# Patient Record
Sex: MALE | Race: BLACK OR AFRICAN AMERICAN | Employment: FULL TIME | URBAN - METROPOLITAN AREA
[De-identification: names, ages, dates, MRNs, and addresses within clinical notes are randomized per-mention and may not be internally consistent; named-entity substitution may affect disease eponyms.]

---

## 2018-03-20 DIAGNOSIS — G56.20 ULNAR NERVE ENTRAPMENT, UNSPECIFIED LATERALITY: ICD-10-CM

## 2018-03-20 DIAGNOSIS — M79.641 PAIN OF RIGHT HAND: ICD-10-CM

## 2018-03-20 DIAGNOSIS — F51.01 PRIMARY INSOMNIA: ICD-10-CM

## 2018-03-21 ENCOUNTER — OFFICE VISIT (OUTPATIENT)
Dept: ENT CLINIC | Age: 43
End: 2018-03-21
Payer: COMMERCIAL

## 2018-03-21 VITALS
HEIGHT: 74 IN | BODY MASS INDEX: 22.59 KG/M2 | OXYGEN SATURATION: 99 % | HEART RATE: 57 BPM | DIASTOLIC BLOOD PRESSURE: 59 MMHG | SYSTOLIC BLOOD PRESSURE: 125 MMHG | WEIGHT: 176 LBS

## 2018-03-21 DIAGNOSIS — G47.30 SLEEP APNEA, UNSPECIFIED TYPE: Primary | ICD-10-CM

## 2018-03-21 DIAGNOSIS — R09.82 POST-NASAL DRIP: ICD-10-CM

## 2018-03-21 DIAGNOSIS — J30.9 CHRONIC ALLERGIC RHINITIS, UNSPECIFIED SEASONALITY, UNSPECIFIED TRIGGER: ICD-10-CM

## 2018-03-21 DIAGNOSIS — J35.01 CHRONIC TONSILLITIS: ICD-10-CM

## 2018-03-21 DIAGNOSIS — J35.8 TONSIL STONE: ICD-10-CM

## 2018-03-21 PROCEDURE — G8427 DOCREV CUR MEDS BY ELIG CLIN: HCPCS | Performed by: OTOLARYNGOLOGY

## 2018-03-21 PROCEDURE — G8420 CALC BMI NORM PARAMETERS: HCPCS | Performed by: OTOLARYNGOLOGY

## 2018-03-21 PROCEDURE — 4004F PT TOBACCO SCREEN RCVD TLK: CPT | Performed by: OTOLARYNGOLOGY

## 2018-03-21 PROCEDURE — 99204 OFFICE O/P NEW MOD 45 MIN: CPT | Performed by: OTOLARYNGOLOGY

## 2018-03-21 PROCEDURE — G8484 FLU IMMUNIZE NO ADMIN: HCPCS | Performed by: OTOLARYNGOLOGY

## 2018-03-21 RX ORDER — FLUTICASONE PROPIONATE 50 MCG
2 SPRAY, SUSPENSION (ML) NASAL DAILY
Qty: 1 BOTTLE | Refills: 3 | Status: SHIPPED | OUTPATIENT
Start: 2018-03-21 | End: 2019-08-23

## 2018-03-21 RX ORDER — TRAZODONE HYDROCHLORIDE 50 MG/1
50 TABLET ORAL NIGHTLY
COMMUNITY
End: 2018-03-26

## 2018-03-21 ASSESSMENT — ENCOUNTER SYMPTOMS
SHORTNESS OF BREATH: 0
NAUSEA: 0
SINUS PRESSURE: 0
RHINORRHEA: 0
SINUS PAIN: 0
SORE THROAT: 0
TROUBLE SWALLOWING: 0
EYES NEGATIVE: 1
COUGH: 0
VOMITING: 0
GASTROINTESTINAL NEGATIVE: 1

## 2018-03-21 NOTE — PROGRESS NOTES
Prescriptions:     traZODone (DESYREL) 50 MG tablet, Take 50 mg by mouth nightly, Disp: , Rfl:     naproxen (NAPROSYN) 500 MG tablet, TAKE ONE TABLET BY MOUTH TWICE DAILY WITH MEALS, Disp: 60 tablet, Rfl: 0    MELATONIN PO, Take by mouth, Disp: , Rfl:   Patient has no known allergies. Social History   Substance Use Topics    Smoking status: Light Tobacco Smoker     Packs/day: 0.25     Years: 12.00     Types: Cigarettes, Cigars    Smokeless tobacco: Never Used    Alcohol use 1.2 oz/week     2 Cans of beer per week      Comment: social     Family History   Problem Relation Age of Onset    Osteoarthritis Neg Hx     Rheum Arthritis Neg Hx     Asthma Neg Hx     Breast Cancer Neg Hx     Cancer Neg Hx     Depression Neg Hx     Diabetes Neg Hx     Heart Failure Neg Hx     High Cholesterol Neg Hx     Hypertension Neg Hx     Migraines Neg Hx     Rashes/Skin Problems Neg Hx     Seizures Neg Hx     Stroke Neg Hx     Thyroid Disease Neg Hx        Review of Systems   Constitutional: Negative for chills and fever. HENT: Positive for postnasal drip. Negative for congestion, rhinorrhea, sinus pain, sinus pressure, sore throat and trouble swallowing. Eyes: Negative. Negative for visual disturbance. Respiratory: Negative for cough and shortness of breath. Gastrointestinal: Negative. Negative for nausea and vomiting. Allergic/Immunologic: Negative for environmental allergies and food allergies. Neurological: Negative for dizziness and headaches. All other systems reviewed and are negative. BP (!) 125/59 (Site: Left Arm, Position: Sitting, Cuff Size: Medium Adult)   Pulse 57   Ht 6' 2\" (1.88 m)   Wt 176 lb (79.8 kg)   SpO2 99%   BMI 22.60 kg/m²   Physical Exam   Constitutional: He appears well-developed and well-nourished. HENT:   Head: Normocephalic and atraumatic. Right Ear: External ear and ear canal normal. Tympanic membrane is scarred.    Left Ear: Tympanic membrane, external ear Stef  1975    I have discussed the case, including pertinent history and exam findings with the PA. I have seen and examined the patient and the key elements of the encounter have been performed by me. I agree with the assessment, plan and orders as documented by the PA. Patient is here to establish care as a new patient in the clinic. I recommend:    Tonsillectomy and adenoidectomy. I will not keep the patient overnight for observation after surgery  The procedure risks and benefits were discussed with the patient and family including:      --Bleeding occurs in 1 to 4% of patients  --Poor speech (hyper nasal speech) occurs in 1/3000 patients. --Nasopharyngeal Stenosis  --Chipped Teeth  --Electrocautery Medina  --Death      Pt and family understood and decided to proceed with the surgery. Remainder of medical problems as per PA note. Patient seen and examined. Agree with above exam, assessment and plan.       Electronically signed by Bernice Mcelroy DO on 3/23/18 at 12:39 PM

## 2018-03-26 RX ORDER — NAPROXEN 500 MG/1
TABLET ORAL
Qty: 60 TABLET | Refills: 3 | Status: SHIPPED | OUTPATIENT
Start: 2018-03-26 | End: 2019-08-23

## 2018-03-26 RX ORDER — TRAZODONE HYDROCHLORIDE 50 MG/1
50 TABLET ORAL NIGHTLY
Qty: 30 TABLET | Refills: 5 | Status: SHIPPED | OUTPATIENT
Start: 2018-03-26 | End: 2018-08-09 | Stop reason: SDUPTHER

## 2018-03-28 ENCOUNTER — TELEPHONE (OUTPATIENT)
Dept: ENT CLINIC | Age: 43
End: 2018-03-28

## 2018-08-09 ENCOUNTER — OFFICE VISIT (OUTPATIENT)
Dept: FAMILY MEDICINE CLINIC | Age: 43
End: 2018-08-09
Payer: COMMERCIAL

## 2018-08-09 VITALS
SYSTOLIC BLOOD PRESSURE: 112 MMHG | TEMPERATURE: 97.3 F | HEIGHT: 74 IN | OXYGEN SATURATION: 98 % | HEART RATE: 70 BPM | WEIGHT: 178 LBS | RESPIRATION RATE: 12 BRPM | BODY MASS INDEX: 22.84 KG/M2 | DIASTOLIC BLOOD PRESSURE: 66 MMHG

## 2018-08-09 DIAGNOSIS — G56.20 ULNAR NERVE ENTRAPMENT, UNSPECIFIED LATERALITY: ICD-10-CM

## 2018-08-09 DIAGNOSIS — M79.641 PAIN OF RIGHT HAND: ICD-10-CM

## 2018-08-09 DIAGNOSIS — Z00.00 PREVENTATIVE HEALTH CARE: Primary | ICD-10-CM

## 2018-08-09 DIAGNOSIS — F51.01 PRIMARY INSOMNIA: ICD-10-CM

## 2018-08-09 PROCEDURE — 99396 PREV VISIT EST AGE 40-64: CPT | Performed by: FAMILY MEDICINE

## 2018-08-09 RX ORDER — NAPROXEN 500 MG/1
TABLET ORAL
Qty: 60 TABLET | Refills: 3 | Status: CANCELLED | OUTPATIENT
Start: 2018-08-09

## 2018-08-09 RX ORDER — TRAZODONE HYDROCHLORIDE 100 MG/1
100 TABLET ORAL NIGHTLY
Qty: 90 TABLET | Refills: 1 | Status: SHIPPED | OUTPATIENT
Start: 2018-08-09 | End: 2019-07-10 | Stop reason: SDUPTHER

## 2018-08-09 ASSESSMENT — ENCOUNTER SYMPTOMS
SHORTNESS OF BREATH: 0
DIARRHEA: 0
WHEEZING: 0
ABDOMINAL PAIN: 0
NAUSEA: 0
BLOOD IN STOOL: 0
CONSTIPATION: 0
COUGH: 0
VOMITING: 0

## 2018-08-09 NOTE — PROGRESS NOTES
Quang Barron  : 1975    Chief Complaint:  Chief Complaint   Patient presents with    Insomnia     follow up     Here for insomnia. Has had for years. Has tried OTC meds including melatonin and PM meds. Goes to bed regular time, about 10pm every night, awake until about 2-3 am and gets up at 5am. Melatonin 5mg did not help. Exercising regularly at the gym. Trying to go at least 5 days per week. Has been having some arm discomfort with lifting. Elbow. Some tingling in the forearma nad hand. Past medical, surgical, family and social histories reviewed and updated today as appropriate    Current Outpatient Prescriptions   Medication Sig Dispense Refill    traZODone (DESYREL) 100 MG tablet Take 1 tablet by mouth nightly 90 tablet 1    diclofenac (VOLTAREN) 50 MG EC tablet Take 1 tablet by mouth 2 times daily as needed for Pain 60 tablet 3    naproxen (NAPROSYN) 500 MG tablet TAKE ONE TABLET BY MOUTH TWICE DAILY WITH MEALS 60 tablet 3    fluticasone (FLONASE) 50 MCG/ACT nasal spray 2 sprays by Nasal route daily 2 sprays in each nostril daily 1 Bottle 3    MELATONIN PO Take by mouth       No current facility-administered medications for this visit. No Known Allergies    REVIEW OF SYSTEMS  Review of Systems   Constitutional: Negative for chills, diaphoresis, fatigue and fever. Eyes: Negative for redness and visual disturbance. Respiratory: Negative for cough, chest tightness, shortness of breath and wheezing. Cardiovascular: Negative for chest pain, palpitations and leg swelling. Gastrointestinal: Negative for abdominal pain, blood in stool, constipation, diarrhea, nausea and vomiting. Endocrine: Negative for polydipsia and polyuria. Musculoskeletal: Negative for arthralgias and myalgias. Skin: Negative for color change, pallor and rash. Neurological: Negative for dizziness, syncope, weakness, numbness and headaches.    Psychiatric/Behavioral: Negative for confusion and dysphoric mood. The patient is not nervous/anxious. All other systems reviewed and are negative. PHYSICAL EXAM  /66 (Site: Left Arm, Position: Sitting, Cuff Size: Medium Adult)   Pulse 70   Temp 97.3 °F (36.3 °C)   Resp 12   Ht 6' 2\" (1.88 m)   Wt 178 lb (80.7 kg)   SpO2 98%   BMI 22.85 kg/m²   Physical Exam   Constitutional: He appears well-developed and well-nourished. No distress. Eyes: Conjunctivae are normal. Pupils are equal, round, and reactive to light. Right eye exhibits no discharge. Left eye exhibits no discharge. No scleral icterus. Neck: Neck supple. Cardiovascular: Normal rate and regular rhythm. Exam reveals no gallop and no friction rub. No murmur heard. Pulmonary/Chest: Effort normal and breath sounds normal. No respiratory distress. He has no wheezes. He has no rales. Lymphadenopathy:     He has no cervical adenopathy. ASSESSMENT/PLAN:     Diagnosis Orders   1. Preventative health care  CBC Auto Differential    Comprehensive Metabolic Panel    Lipid Panel   2. Pain of right hand  diclofenac (VOLTAREN) 50 MG EC tablet   3. Ulnar nerve entrapment, unspecified laterality     4. Primary insomnia  traZODone (DESYREL) 100 MG tablet     Use home steroid cream for eczema, does not want anything for itch  Trial increased trazodone for insomnia, reviewed sleep hygiene  NSAID for arm PRN, get brace  HM reviewed today and counseled as appropriate    No future appointments.     Genesis Ellsworth MD   8/9/18

## 2018-08-19 ASSESSMENT — ENCOUNTER SYMPTOMS
CHEST TIGHTNESS: 0
EYE REDNESS: 0
COLOR CHANGE: 0

## 2019-05-29 ENCOUNTER — TELEPHONE (OUTPATIENT)
Dept: FAMILY MEDICINE CLINIC | Age: 44
End: 2019-05-29

## 2019-05-30 DIAGNOSIS — M79.641 RIGHT HAND PAIN: Primary | ICD-10-CM

## 2019-05-30 DIAGNOSIS — M25.522 LEFT ELBOW PAIN: ICD-10-CM

## 2019-07-10 ENCOUNTER — OFFICE VISIT (OUTPATIENT)
Dept: FAMILY MEDICINE CLINIC | Age: 44
End: 2019-07-10
Payer: COMMERCIAL

## 2019-07-10 VITALS
TEMPERATURE: 98.2 F | DIASTOLIC BLOOD PRESSURE: 76 MMHG | HEART RATE: 63 BPM | BODY MASS INDEX: 23.77 KG/M2 | RESPIRATION RATE: 16 BRPM | HEIGHT: 74 IN | WEIGHT: 185.2 LBS | SYSTOLIC BLOOD PRESSURE: 124 MMHG | OXYGEN SATURATION: 98 %

## 2019-07-10 DIAGNOSIS — G89.29 CHRONIC LOW BACK PAIN, UNSPECIFIED BACK PAIN LATERALITY, WITH SCIATICA PRESENCE UNSPECIFIED: Primary | ICD-10-CM

## 2019-07-10 DIAGNOSIS — F51.01 PRIMARY INSOMNIA: ICD-10-CM

## 2019-07-10 DIAGNOSIS — M54.5 CHRONIC LOW BACK PAIN, UNSPECIFIED BACK PAIN LATERALITY, WITH SCIATICA PRESENCE UNSPECIFIED: Primary | ICD-10-CM

## 2019-07-10 DIAGNOSIS — G56.03 CARPAL TUNNEL SYNDROME ON BOTH SIDES: ICD-10-CM

## 2019-07-10 DIAGNOSIS — K62.5 BRBPR (BRIGHT RED BLOOD PER RECTUM): ICD-10-CM

## 2019-07-10 DIAGNOSIS — K64.9 HEMORRHOIDS, UNSPECIFIED HEMORRHOID TYPE: ICD-10-CM

## 2019-07-10 DIAGNOSIS — R10.84 GENERALIZED ABDOMINAL PAIN: ICD-10-CM

## 2019-07-10 PROCEDURE — 99214 OFFICE O/P EST MOD 30 MIN: CPT | Performed by: FAMILY MEDICINE

## 2019-07-10 RX ORDER — TRAZODONE HYDROCHLORIDE 100 MG/1
100 TABLET ORAL NIGHTLY
Qty: 90 TABLET | Refills: 1 | Status: SHIPPED
Start: 2019-07-10 | End: 2022-03-04 | Stop reason: SDUPTHER

## 2019-07-10 RX ORDER — METHYLPREDNISOLONE 4 MG/1
TABLET ORAL
Qty: 1 KIT | Refills: 0 | Status: SHIPPED | OUTPATIENT
Start: 2019-07-10 | End: 2019-07-16

## 2019-07-10 ASSESSMENT — PATIENT HEALTH QUESTIONNAIRE - PHQ9
2. FEELING DOWN, DEPRESSED OR HOPELESS: 0
SUM OF ALL RESPONSES TO PHQ QUESTIONS 1-9: 0
SUM OF ALL RESPONSES TO PHQ QUESTIONS 1-9: 0
1. LITTLE INTEREST OR PLEASURE IN DOING THINGS: 0
SUM OF ALL RESPONSES TO PHQ9 QUESTIONS 1 & 2: 0

## 2019-07-10 NOTE — PROGRESS NOTES
tenderness, pain and spasm. He exhibits normal range of motion, no swelling, no edema, no deformity, no laceration and normal pulse. Skin: Skin is warm and dry. No rash noted. No erythema. No pallor. Vitals reviewed. ASSESSMENT/PLAN:     Diagnosis Orders   1. Chronic low back pain, unspecified back pain laterality, with sciatica presence unspecified  methylPREDNISolone (MEDROL, JAVI,) 4 MG tablet   2. Generalized abdominal pain  Linda Brunson MD, Gastroenterology, [place] ([facility])   3. Hemorrhoids, unspecified hemorrhoid type  Linda Brunson MD, Gastroenterology, [place] ([facility])   4. BRBPR (bright red blood per rectum)  Linda Brunson MD, Gastroenterology, [place] ([facility])   5. Carpal tunnel syndrome on both sides, chronic     6. Primary insomnia  traZODone (DESYREL) 100 MG tablet       Reviewed age and gender appropriate health screening exams and vaccinations. Advisedpatient regarding importance of keeping up with recommended health maintenance and to schedule as soon as possible if overdue, as this is important in assessing for undiagnosed pathology, especially cancer. Patientverbalizes understanding and agrees. Call or go to ED immediately if symptoms worsen or persist.  No follow-ups on file. Sooner if necessary. Counseled regarding above diagnosis, including possible risks and complications,especially if left uncontrolled. Counseled regarding the possible side effects, risks, benefits and alternatives to treatment; patient and/or guardian verbalizes understanding. Advised patient to call with any newmedication issues. All questions answered.     Inna Graham MD  7/10/19

## 2019-07-27 ASSESSMENT — ENCOUNTER SYMPTOMS
CHEST TIGHTNESS: 0
COLOR CHANGE: 0
ABDOMINAL PAIN: 0
COUGH: 0
BACK PAIN: 1
VOMITING: 0
BLOOD IN STOOL: 0
DIARRHEA: 0
CONSTIPATION: 0
WHEEZING: 0
SHORTNESS OF BREATH: 0

## 2019-08-23 ENCOUNTER — HOSPITAL ENCOUNTER (EMERGENCY)
Age: 44
Discharge: HOME | End: 2019-08-23
Attending: EMERGENCY MEDICINE
Payer: COMMERCIAL

## 2019-08-23 VITALS
OXYGEN SATURATION: 98 % | TEMPERATURE: 97.9 F | HEART RATE: 86 BPM | WEIGHT: 177 LBS | BODY MASS INDEX: 22.72 KG/M2 | RESPIRATION RATE: 16 BRPM | SYSTOLIC BLOOD PRESSURE: 138 MMHG | DIASTOLIC BLOOD PRESSURE: 78 MMHG | HEIGHT: 74 IN

## 2019-08-23 DIAGNOSIS — L24.3 IRRITANT CONTACT DERMATITIS DUE TO COSMETICS: Primary | ICD-10-CM

## 2019-08-23 PROCEDURE — 96372 THER/PROPH/DIAG INJ SC/IM: CPT

## 2019-08-23 PROCEDURE — 6360000002 HC RX W HCPCS: Performed by: EMERGENCY MEDICINE

## 2019-08-23 PROCEDURE — 99282 EMERGENCY DEPT VISIT SF MDM: CPT

## 2019-08-23 RX ORDER — TRIAMCINOLONE ACETONIDE 40 MG/ML
40 INJECTION, SUSPENSION INTRA-ARTICULAR; INTRAMUSCULAR ONCE
Status: COMPLETED | OUTPATIENT
Start: 2019-08-23 | End: 2019-08-23

## 2019-08-23 RX ORDER — TRIAMCINOLONE ACETONIDE 5 MG/G
CREAM TOPICAL
Qty: 45 G | Refills: 0 | Status: SHIPPED | OUTPATIENT
Start: 2019-08-23 | End: 2019-08-30

## 2019-08-23 RX ORDER — PREDNISONE 20 MG/1
TABLET ORAL
Qty: 18 TABLET | Refills: 0 | Status: SHIPPED | OUTPATIENT
Start: 2019-08-23 | End: 2019-09-02

## 2019-08-23 RX ADMIN — TRIAMCINOLONE ACETONIDE 40 MG: 40 INJECTION, SUSPENSION INTRA-ARTICULAR; INTRAMUSCULAR at 06:01

## 2019-08-23 NOTE — ED NOTES
Facial itching under his beard.  Did not take Benadryl or OTC     Manassa CONNOR Mendoza  08/23/19 [id number]

## 2019-08-23 NOTE — ED PROVIDER NOTES
HPI:  8/23/19, Time: 5:37 AM        Julia Wright is a 38 y.o. male presenting to the ED for rash and itching, beginning 8 hours ago. The complaint has been persistent, moderate in severity, and worsened by nothing. Rash began after using a beard dye coloring agent. No lip/tongue swelling, no fever/chills, no shortness of breath, no n/v/d, rash is involving beard area only. No rash on extremities nor trunk. No other complaints. Review of Systems:   Pertinent positives and negatives are stated within HPI, all other systems reviewed and are negative.      --------------------------------------------- PAST HISTORY ---------------------------------------------  Past Medical History:  has a past medical history of Carpal tunnel syndrome on both sides, chronic, DeQuervain's disease (tenosynovitis), Diarrhea, and Finger laceration. Past Surgical History:  has a past surgical history that includes Vasectomy (2007); Finger fracture surgery (1990); Carpal tunnel release; Ulnar tunnel release; Hemorrhoid surgery (02/20/2015); and cyst removal (2007). Social History:  reports that he has been smoking cigarettes and cigars. He has a 3.00 pack-year smoking history. He has never used smokeless tobacco. He reports that he drinks about 2.0 standard drinks of alcohol per week. He reports that he does not use drugs. Family History: family history is not on file. The patients home medications have been reviewed. Allergies: Patient has no known allergies. -------------------------------------------------- RESULTS -------------------------------------------------  All laboratory and radiology results have been personally reviewed by myself   LABS:  No results found for this visit on 08/23/19.     RADIOLOGY:  Interpreted by Radiologist.  No orders to display       ------------------------- NURSING NOTES AND VITALS REVIEWED ---------------------------    The nursing notes within the ED encounter and vital was transcribed using voice recognition software.  Every effort was made to ensure accuracy; however, inadvertent computerized transcription errors may be present        Sheyla Jensen MD  08/27/19 [id number]

## 2019-11-13 ENCOUNTER — TELEPHONE (OUTPATIENT)
Dept: FAMILY MEDICINE CLINIC | Age: 44
End: 2019-11-13

## 2019-11-14 DIAGNOSIS — M79.641 PAIN OF RIGHT HAND: ICD-10-CM

## 2020-05-21 ENCOUNTER — TELEPHONE (OUTPATIENT)
Dept: ADMINISTRATIVE | Age: 45
End: 2020-05-21

## 2020-08-25 ENCOUNTER — APPOINTMENT (OUTPATIENT)
Dept: CT IMAGING | Age: 45
End: 2020-08-25
Payer: COMMERCIAL

## 2020-08-25 ENCOUNTER — HOSPITAL ENCOUNTER (EMERGENCY)
Age: 45
Discharge: HOME | End: 2020-08-25
Attending: EMERGENCY MEDICINE
Payer: COMMERCIAL

## 2020-08-25 VITALS
HEIGHT: 74 IN | BODY MASS INDEX: 23.1 KG/M2 | OXYGEN SATURATION: 97 % | DIASTOLIC BLOOD PRESSURE: 76 MMHG | WEIGHT: 180 LBS | RESPIRATION RATE: 20 BRPM | HEART RATE: 66 BPM | SYSTOLIC BLOOD PRESSURE: 128 MMHG | TEMPERATURE: 98.1 F

## 2020-08-25 PROCEDURE — 99285 EMERGENCY DEPT VISIT HI MDM: CPT

## 2020-08-25 PROCEDURE — 99284 EMERGENCY DEPT VISIT MOD MDM: CPT

## 2020-08-25 PROCEDURE — 96375 TX/PRO/DX INJ NEW DRUG ADDON: CPT

## 2020-08-25 PROCEDURE — 70450 CT HEAD/BRAIN W/O DYE: CPT

## 2020-08-25 PROCEDURE — 96374 THER/PROPH/DIAG INJ IV PUSH: CPT

## 2020-08-25 PROCEDURE — 6360000002 HC RX W HCPCS: Performed by: EMERGENCY MEDICINE

## 2020-08-25 RX ORDER — DIPHENHYDRAMINE HYDROCHLORIDE 50 MG/ML
25 INJECTION INTRAMUSCULAR; INTRAVENOUS ONCE
Status: COMPLETED | OUTPATIENT
Start: 2020-08-25 | End: 2020-08-25

## 2020-08-25 RX ORDER — METOCLOPRAMIDE HYDROCHLORIDE 5 MG/ML
10 INJECTION INTRAMUSCULAR; INTRAVENOUS ONCE
Status: COMPLETED | OUTPATIENT
Start: 2020-08-25 | End: 2020-08-25

## 2020-08-25 RX ADMIN — DIPHENHYDRAMINE HYDROCHLORIDE 25 MG: 50 INJECTION, SOLUTION INTRAMUSCULAR; INTRAVENOUS at 19:21

## 2020-08-25 RX ADMIN — METOCLOPRAMIDE 10 MG: 5 INJECTION, SOLUTION INTRAMUSCULAR; INTRAVENOUS at 19:20

## 2020-08-25 ASSESSMENT — ENCOUNTER SYMPTOMS
EYE REDNESS: 0
EYE PAIN: 0
ABDOMINAL PAIN: 0
SHORTNESS OF BREATH: 0
BACK PAIN: 0
SORE THROAT: 0
EYE DISCHARGE: 0
DIARRHEA: 0
COUGH: 0
PHOTOPHOBIA: 0
VOMITING: 0
NAUSEA: 0
WHEEZING: 0
SINUS PRESSURE: 0

## 2020-08-25 ASSESSMENT — PAIN SCALES - GENERAL: PAINLEVEL_OUTOF10: 5

## 2020-08-25 NOTE — ED PROVIDER NOTES
Department of Emergency Medicine   ED  Provider Note  Admit Date/RoomTime: 8/25/2020  5:40 PM  ED Room: [facility] [id number]                  History of Present Illness:  8/25/20, Time: 6:41 PM EDT         Jose Richmond is a 41 y.o. male presenting to the ED for headache , beginning on Saturday, 3 days ago. The complaint has been intermittent, moderate in severity, and worsened by nothing. no history of migraines. Headache not similar to prior headaches. + gradual in onset, not worst headache of life, not maximal at onset, not thunderclap. No fever, chills, neck pain, neck stiffness, trauma, recent illness, speech changes, or any neurologic symptoms or deficits, or vision changes. No household contacts with headache. Patient reports severity the pain ranges from 3-5. He reports he is been alternating Tylenol, Motrin, Excedrin Migraine with only partial relief. He reports he is having a pounding pain in his left temple and can feel the artery there. Reports he had a family member with a history of aneurysm and is concerned that is the cause of his headache causing his presentation today. No SAH risk factors      Review of Systems:   Pertinent positives and negatives are stated within HPI, all other systems reviewed and are negative. Review of Systems   Constitutional: Negative for chills and fever. HENT: Negative for ear pain, sinus pressure and sore throat. Eyes: Negative for photophobia, pain, discharge, redness and visual disturbance. Respiratory: Negative for cough, shortness of breath and wheezing. Cardiovascular: Negative for chest pain. Gastrointestinal: Negative for abdominal pain, diarrhea, nausea and vomiting. Genitourinary: Negative for dysuria and frequency. Musculoskeletal: Negative for arthralgias and back pain. Skin: Negative for rash and wound. Neurological: Positive for headaches. Negative for weakness. Hematological: Negative for adenopathy.    All other systems reviewed and are negative.             --------------------------------------------- PAST HISTORY ---------------------------------------------  Past Medical History:  has a past medical history of Carpal tunnel syndrome on both sides, chronic, DeQuervain's disease (tenosynovitis), Diarrhea, and Finger laceration. Past Surgical History:  has a past surgical history that includes Vasectomy (2007); Finger fracture surgery (1990); Carpal tunnel release; Ulnar tunnel release; Hemorrhoid surgery (02/20/2015); and cyst removal (2007). Social History:  reports that he has been smoking cigarettes and cigars. He has a 3.00 pack-year smoking history. He has never used smokeless tobacco. He reports current alcohol use of about 2.0 standard drinks of alcohol per week. He reports current drug use. Drug: Marijuana. Family History: family history is not on file. The patients home medications have been reviewed. Allergies: Patient has no known allergies. ---------------------------------------------------PHYSICAL EXAM--------------------------------------    Constitutional/General: Alert and oriented x3, well appearing, non toxic in NAD  Head: Normocephalic and atraumatic. No tenderness over temporal artery. Eyes: PERRL, EOMI  Mouth: Oropharynx clear, handling secretions, no trismus  Neck: Supple, full ROM, non tender to palpation in the midline, no stridor, no crepitus, no meningeal signs  Pulmonary: Lungs clear to auscultation bilaterally, no wheezes, rales, or rhonchi. Not in respiratory distress  Cardiovascular:  Regular rate. Regular rhythm. No murmurs, gallops, or rubs. 2+ distal pulses  Abdomen: Soft. Non tender. Non distended. +BS. No rebound, guarding, or rigidity. Musculoskeletal: Moves all extremities x 4. Warm and well perfused, no clubbing, cyanosis, or edema. Capillary refill <3 seconds  Skin: warm and dry. No rashes.    Neurologic: GCS 15, no focal deficits, CN 2-12 grossly intact, Patients symptoms are improving      Counseling: The emergency provider has spoken with the patient and discussed todays results, in addition to providing specific details for the plan of care and counseling regarding the diagnosis and prognosis. Questions are answered at this time and they are agreeable with the plan.       --------------------------------- IMPRESSION AND DISPOSITION ---------------------------------    IMPRESSION  1. Acute nonintractable headache, unspecified headache type        DISPOSITION  Disposition: Discharge to home  Patient condition is stable        NOTE: This report was transcribed using voice recognition software.  Every effort was made to ensure accuracy; however, inadvertent computerized transcription errors may be present         Kerri Jolley DO  08/27/20 1052

## 2020-11-11 ENCOUNTER — TELEPHONE (OUTPATIENT)
Dept: PRIMARY CARE CLINIC | Age: 45
End: 2020-11-11

## 2020-11-11 NOTE — TELEPHONE ENCOUNTER
States bowel issues- when he goes to the bathroom he has bleeding-states he has blood pouring out of him- states it is a steady stream of blood-reports a hx of internal hemmoroids w/previous surgery. This occurring about every other day. Denies any other sx, lightheadedness/fatigue. Scheduled with Dr. Shreyas Biggs for 11/18, patient will call Dr. Kristine Ramos office to see if they can get him in sooner (ph # given), and if sx get worse or don't better he will go to the ER.

## 2020-11-18 ENCOUNTER — OFFICE VISIT (OUTPATIENT)
Dept: PRIMARY CARE CLINIC | Age: 45
End: 2020-11-18
Payer: COMMERCIAL

## 2020-11-18 VITALS
BODY MASS INDEX: 23.1 KG/M2 | OXYGEN SATURATION: 100 % | SYSTOLIC BLOOD PRESSURE: 114 MMHG | HEART RATE: 61 BPM | WEIGHT: 180 LBS | HEIGHT: 74 IN | TEMPERATURE: 97.9 F | DIASTOLIC BLOOD PRESSURE: 73 MMHG

## 2020-11-18 DIAGNOSIS — Z00.00 PREVENTATIVE HEALTH CARE: ICD-10-CM

## 2020-11-18 LAB
ALBUMIN SERPL-MCNC: 4.2 G/DL (ref 3.5–5.2)
ALP BLD-CCNC: 38 U/L (ref 40–129)
ALT SERPL-CCNC: 10 U/L (ref 0–40)
ANION GAP SERPL CALCULATED.3IONS-SCNC: 7 MMOL/L (ref 7–16)
AST SERPL-CCNC: 17 U/L (ref 0–39)
BASOPHILS ABSOLUTE: 0.05 E9/L (ref 0–0.2)
BASOPHILS RELATIVE PERCENT: 1 % (ref 0–2)
BILIRUB SERPL-MCNC: 0.4 MG/DL (ref 0–1.2)
BUN BLDV-MCNC: 13 MG/DL (ref 6–20)
CALCIUM SERPL-MCNC: 9.3 MG/DL (ref 8.6–10.2)
CHLORIDE BLD-SCNC: 106 MMOL/L (ref 98–107)
CHOLESTEROL, TOTAL: 156 MG/DL (ref 0–199)
CO2: 28 MMOL/L (ref 22–29)
CREAT SERPL-MCNC: 1.2 MG/DL (ref 0.7–1.2)
EOSINOPHILS ABSOLUTE: 0.21 E9/L (ref 0.05–0.5)
EOSINOPHILS RELATIVE PERCENT: 4.2 % (ref 0–6)
GFR AFRICAN AMERICAN: >60
GFR NON-AFRICAN AMERICAN: >60 ML/MIN/1.73
GLUCOSE BLD-MCNC: 98 MG/DL (ref 74–99)
HCT VFR BLD CALC: 41.5 % (ref 37–54)
HDLC SERPL-MCNC: 65 MG/DL
HEMOGLOBIN: 13.4 G/DL (ref 12.5–16.5)
IMMATURE GRANULOCYTES #: 0.01 E9/L
IMMATURE GRANULOCYTES %: 0.2 % (ref 0–5)
LDL CHOLESTEROL CALCULATED: 76 MG/DL (ref 0–99)
LYMPHOCYTES ABSOLUTE: 1.4 E9/L (ref 1.5–4)
LYMPHOCYTES RELATIVE PERCENT: 27.9 % (ref 20–42)
MCH RBC QN AUTO: 28.9 PG (ref 26–35)
MCHC RBC AUTO-ENTMCNC: 32.3 % (ref 32–34.5)
MCV RBC AUTO: 89.4 FL (ref 80–99.9)
MONOCYTES ABSOLUTE: 0.95 E9/L (ref 0.1–0.95)
MONOCYTES RELATIVE PERCENT: 18.9 % (ref 2–12)
NEUTROPHILS ABSOLUTE: 2.4 E9/L (ref 1.8–7.3)
NEUTROPHILS RELATIVE PERCENT: 47.8 % (ref 43–80)
PDW BLD-RTO: 14.5 FL (ref 11.5–15)
PLATELET # BLD: 257 E9/L (ref 130–450)
PMV BLD AUTO: 10.4 FL (ref 7–12)
POTASSIUM SERPL-SCNC: 5.5 MMOL/L (ref 3.5–5)
RBC # BLD: 4.64 E12/L (ref 3.8–5.8)
SODIUM BLD-SCNC: 141 MMOL/L (ref 132–146)
TOTAL PROTEIN: 6.5 G/DL (ref 6.4–8.3)
TRIGL SERPL-MCNC: 74 MG/DL (ref 0–149)
VLDLC SERPL CALC-MCNC: 15 MG/DL
WBC # BLD: 5 E9/L (ref 4.5–11.5)

## 2020-11-18 PROCEDURE — 99396 PREV VISIT EST AGE 40-64: CPT | Performed by: FAMILY MEDICINE

## 2020-11-18 RX ORDER — MELOXICAM 15 MG/1
15 TABLET ORAL DAILY
Qty: 30 TABLET | Refills: 3 | Status: SHIPPED
Start: 2020-11-18 | End: 2021-04-23 | Stop reason: SDUPTHER

## 2020-11-18 ASSESSMENT — ENCOUNTER SYMPTOMS
DIARRHEA: 0
COUGH: 0
VOMITING: 0
WHEEZING: 0
ABDOMINAL PAIN: 0
SHORTNESS OF BREATH: 0
BLOOD IN STOOL: 0
CONSTIPATION: 0
CHEST TIGHTNESS: 0
BACK PAIN: 1
COLOR CHANGE: 0

## 2020-11-18 NOTE — PROGRESS NOTES
daily 30 tablet 3    mupirocin (BACTROBAN) 2 % ointment Apply topically 3 times daily. 22 g 0    traZODone (DESYREL) 100 MG tablet Take 1 tablet by mouth nightly 90 tablet 1     No current facility-administered medications for this visit. No Known Allergies      REVIEW OF SYSTEMS  Review of Systems   Constitutional: Positive for activity change. Negative for appetite change, chills, diaphoresis, fatigue, fever and unexpected weight change. Respiratory: Negative for cough, chest tightness, shortness of breath and wheezing. Cardiovascular: Negative for chest pain, palpitations and leg swelling. Gastrointestinal: Negative for abdominal pain, blood in stool, constipation, diarrhea and vomiting. Endocrine: Negative for cold intolerance, heat intolerance, polydipsia, polyphagia and polyuria. Musculoskeletal: Positive for arthralgias, back pain, gait problem, joint swelling and myalgias. Negative for neck pain and neck stiffness. Skin: Negative for color change, pallor and rash. Neurological: Positive for weakness and numbness. Negative for dizziness, syncope, light-headedness and headaches. All other systems reviewed and are negative. PHYSICAL EXAM  /73   Pulse 61   Temp 97.9 °F (36.6 °C)   Ht 6' 2\" (1.88 m)   Wt 180 lb (81.6 kg)   SpO2 100%   BMI 23.11 kg/m²   Physical Exam  Vitals signs reviewed. Constitutional:       General: He is not in acute distress. Appearance: He is well-developed. Neck:      Musculoskeletal: Normal range of motion and neck supple. Cardiovascular:      Rate and Rhythm: Normal rate and regular rhythm. Heart sounds: Normal heart sounds. Pulmonary:      Effort: Pulmonary effort is normal.      Breath sounds: Normal breath sounds. No wheezing or rales. Musculoskeletal:         General: Tenderness present. No deformity. Right wrist: He exhibits decreased range of motion and tenderness.  He exhibits no swelling, no effusion, no crepitus, no deformity and no laceration. Left wrist: He exhibits decreased range of motion and tenderness. He exhibits no bony tenderness, no swelling, no effusion, no crepitus and no deformity. Lumbar back: He exhibits tenderness, pain and spasm. He exhibits normal range of motion, no swelling, no edema, no deformity, no laceration and normal pulse. Skin:     General: Skin is warm and dry. Coloration: Skin is not pale. Findings: No erythema or rash. ASSESSMENT/PLAN:     Diagnosis Orders   1. Preventative health care  CBC Auto Differential    Comprehensive Metabolic Panel    Lipid Panel   2. De Quervain's disease (tenosynovitis)  SKYLA Buckner MD, Psychiatry, [place]   3. Chronic bilateral low back pain without sciatica  SKYLA Buckner MD, Psychiatry, [place]    meloxicam ([address]) 15 MG tablet     Counseling/Anticipatory Guidance provided on the following: Nutrition, physical activity, healthy weight, injury prevention, misuse of tobacco, alcohol and drugs, sexual behavior and STDs, contraception, dental health, mental health, immunizations, screenings:  Health Maintenance Due   Topic Date Due    Pneumococcal 0-64 years Vaccine (1 of 1 - PPSV23) 04/15/1982    Flu vaccine (1) 09/01/2020       Reviewed age and gender appropriate health screening exams and vaccinations. Advisedpatient regarding importance of keeping up with recommended health maintenance and to schedule as soon as possible if overdue, as this is important in assessing for undiagnosed pathology, especially cancer. Patientverbalizes understanding and agrees. Call or go to ED immediately if symptoms worsen or persist.  No follow-ups on file. Sooner if necessary. Counseled regarding above diagnosis, including possible risks and complications,especially if left uncontrolled.   Counseled regarding the possible side effects, risks, benefits and alternatives to treatment;

## 2020-12-10 ENCOUNTER — OFFICE VISIT (OUTPATIENT)
Dept: SURGERY | Age: 45
End: 2020-12-10
Payer: COMMERCIAL

## 2020-12-10 VITALS
BODY MASS INDEX: 23.1 KG/M2 | WEIGHT: 180 LBS | DIASTOLIC BLOOD PRESSURE: 70 MMHG | HEART RATE: 94 BPM | SYSTOLIC BLOOD PRESSURE: 115 MMHG | TEMPERATURE: 97.5 F | HEIGHT: 74 IN

## 2020-12-10 PROCEDURE — 99204 OFFICE O/P NEW MOD 45 MIN: CPT | Performed by: SURGERY

## 2020-12-10 NOTE — PROGRESS NOTES
General Surgery History and Physical    Patient's Name/Date of Birth: Emily Perkins / 1975    Date: December 10, 2020     Surgeon: Robyn Alexis M.D.    PCP: Junior Aj MD     Chief Complaint: hemorrhoids    HPI:   Emily Perkins is a 41 y.o. male who presents for evaluation of hemorrhoid that are painful. They have been present for years, is  having daily bms, does not take stool softener and does not take fiber. The patient has  had medicines to treat the hemorrhoids. The hemorrhoids cause severe pain, some bleeding. Last colonoscopy was recent. Past Medical History:   Diagnosis Date    Carpal tunnel syndrome on both sides, chronic     DeQuervain's disease (tenosynovitis) 3/21/2011    Diarrhea     Finger laceration 3/21/2011       Past Surgical History:   Procedure Laterality Date    CARPAL TUNNEL RELEASE      CYST REMOVAL  2007    Spermcidal    FINGER FRACTURE SURGERY  1990    HEMORRHOID SURGERY  02/20/2015    ULNAR TUNNEL RELEASE      VASECTOMY  2007       Current Outpatient Medications   Medication Sig Dispense Refill    meloxicam (MOBIC) 15 MG tablet Take 1 tablet by mouth daily 30 tablet 3    mupirocin (BACTROBAN) 2 % ointment Apply topically 3 times daily. 22 g 0    traZODone (DESYREL) 100 MG tablet Take 1 tablet by mouth nightly 90 tablet 1     No current facility-administered medications for this visit. No Known Allergies    The patient has a family history that is negative for severe cardiovascular or respiratory issues, negative for reaction to anesthesia.     Social History     Socioeconomic History    Marital status: Single     Spouse name: Not on file    Number of children: Not on file    Years of education: Not on file    Highest education level: Not on file   Occupational History    Not on file   Social Needs    Financial resource strain: Not on file    Food insecurity     Worry: Not on file     Inability: Not on file   [employer] needs Medical: Not on file     Non-medical: Not on file   Tobacco Use    Smoking status: Light Tobacco Smoker     Packs/day: 0.25     Years: 12.00     Pack years: 3.00     Types: Cigarettes, Cigars    Smokeless tobacco: Never Used   Substance and Sexual Activity    Alcohol use: Yes     Alcohol/week: 2.0 standard drinks     Types: 2 Cans of beer per week     Comment: social    Drug use: Yes     Types: Marijuana     Comment: used to smoke marijuana/ said he only did monday to stop the nausea.       Sexual activity: Not on file   Lifestyle    Physical activity     Days per week: Not on file     Minutes per session: Not on file    Stress: Not on file   Relationships    Social connections     Talks on phone: Not on file     Gets together: Not on file     Attends Orthodoxy service: Not on file     Active member of club or organization: Not on file     Attends meetings of clubs or organizations: Not on file     Relationship status: Not on file    Intimate partner violence     Fear of current or ex partner: Not on file     Emotionally abused: Not on file     Physically abused: Not on file     Forced sexual activity: Not on file   Other Topics Concern    Not on file   Social History Narrative    ** Merged History Encounter **                Review of Systems  Review of Systems -  General ROS: negative for - chills, fatigue or malaise  ENT ROS: negative for - hearing change, nasal congestion or nasal discharge  Allergy and Immunology ROS: negative for - hives, itchy/watery eyes or nasal congestion  Hematological and Lymphatic ROS: negative for - blood clots, blood transfusions, bruising or fatigue  Endocrine ROS: negative for - malaise/lethargy, mood swings, palpitations or polydipsia/polyuria  Breast ROS: negative for - new or changing breast lumps or nipple changes  Respiratory ROS: negative for - sputum changes, stridor, tachypnea or wheezing  Cardiovascular ROS: negative for - irregular heartbeat, loss of consciousness, murmur or orthopnea  Gastrointestinal ROS: negative for - constipation, diarrhea, gas/bloating, heartburn or hematemesis, positive for hemorroids with intermittent bleeding and pain  Genito-Urinary ROS: negative for -  genital discharge, genital ulcers or hematuria  Musculoskeletal ROS: negative for - gait disturbance, muscle pain or muscular weakness    Physical exam:   /70   Pulse 94   Temp 97.5 °F (36.4 °C) (Temporal)   Ht 6' 2\" (1.88 m)   Wt 180 lb (81.6 kg)   BMI 23.11 kg/m²   General appearance:  NAD  Pyscho/social: negative for tremors and hallucinations  Head: NCAT, PERRLA, EOMI, red conjunctiva  Neck: supple, no masses  Lungs: CTAB, equal chest rise bilateral  Heart: Reg rate  Abdomen: soft, nontender, nondistended  Rectal: internal hemorrhoids, non inflamed, non thrombosed. Skin; no lesions  Gu: no cva tenderness  Extremities: extremities normal, atraumatic, no cyanosis or edema      Assessment:  41 y.o. male with hemorrhoids    Plan:  High fiber diet, stool softener, prn laxative for daily bm and hemorrhoidectomy.   Tamy Davis MD  3:38 PM  12/10/2020

## 2021-03-03 ENCOUNTER — OFFICE VISIT (OUTPATIENT)
Dept: ORTHOPEDIC SURGERY | Age: 46
End: 2021-03-03
Payer: COMMERCIAL

## 2021-03-03 ENCOUNTER — OFFICE VISIT (OUTPATIENT)
Dept: PRIMARY CARE CLINIC | Age: 46
End: 2021-03-03
Payer: COMMERCIAL

## 2021-03-03 ENCOUNTER — HOSPITAL ENCOUNTER (OUTPATIENT)
Dept: GENERAL RADIOLOGY | Age: 46
Discharge: HOME | End: 2021-03-05
Payer: COMMERCIAL

## 2021-03-03 ENCOUNTER — HOSPITAL ENCOUNTER (OUTPATIENT)
Age: 46
Discharge: HOME | End: 2021-03-05
Payer: COMMERCIAL

## 2021-03-03 VITALS
TEMPERATURE: 97.8 F | SYSTOLIC BLOOD PRESSURE: 122 MMHG | DIASTOLIC BLOOD PRESSURE: 84 MMHG | HEIGHT: 74 IN | OXYGEN SATURATION: 99 % | WEIGHT: 176 LBS | BODY MASS INDEX: 22.59 KG/M2 | HEART RATE: 59 BPM

## 2021-03-03 VITALS — TEMPERATURE: 98.1 F

## 2021-03-03 DIAGNOSIS — S69.91XA INJURY OF RIGHT HAND, INITIAL ENCOUNTER: ICD-10-CM

## 2021-03-03 DIAGNOSIS — S62.306A CLOSED FRACTURE OF FIFTH METACARPAL BONE OF RIGHT HAND, UNSPECIFIED FRACTURE MORPHOLOGY, INITIAL ENCOUNTER: Primary | ICD-10-CM

## 2021-03-03 DIAGNOSIS — S62.336A CLOSED DISPLACED FRACTURE OF NECK OF FIFTH METACARPAL BONE OF RIGHT HAND, INITIAL ENCOUNTER: Primary | ICD-10-CM

## 2021-03-03 PROCEDURE — 29125 APPL SHORT ARM SPLINT STATIC: CPT | Performed by: PHYSICIAN ASSISTANT

## 2021-03-03 PROCEDURE — 99202 OFFICE O/P NEW SF 15 MIN: CPT | Performed by: PHYSICIAN ASSISTANT

## 2021-03-03 PROCEDURE — 99214 OFFICE O/P EST MOD 30 MIN: CPT | Performed by: NURSE PRACTITIONER

## 2021-03-03 PROCEDURE — 26600 TREAT METACARPAL FRACTURE: CPT | Performed by: PHYSICIAN ASSISTANT

## 2021-03-03 PROCEDURE — 73130 X-RAY EXAM OF HAND: CPT

## 2021-03-03 NOTE — PROGRESS NOTES
reports current drug use. Drug: Marijuana. Family History: family history is not on file. Allergies: Patient has no known allergies. Physical Exam   Vital Signs:  /84   Pulse 59   Temp 97.8 °F (36.6 °C)   Ht 6' 2\" (1.88 m)   Wt 176 lb (79.8 kg)   SpO2 99%   BMI 22.60 kg/m²    Oxygen Saturation Interpretation: Normal.    Constitutional:  A&Ox3, development consistent with age, NAD. CV: Heart RRR, no murmurs, rubs, or gallops. Lungs: CTAB without W/R/R. Wrist: Right              Tenderness:  None            Swelling: None             Deformity: No obvious deformity noted. ROM: Full range of motion             Skin:  No erythema, rashes, or abrasions noted. Neurovascular: Motor deficit: /UE strength 5/5 bilaterally. No increased pain with supination or pronation of the hand. Sensory deficit:   Sensation intact proximally and distally to the injury site. Pulse deficit: 2+ and bounding. Capillary refill: Less then 2 sec throughout. Hand: Right              Tenderness: to distal metacarpal of small finger              Swelling: noted to lateral aspect of hand. Deformity: No obvious deformity. No malrotation noted. ROM: Decreased ROM due to pain. Pain with extension of little finger. Tightness with flexion of little finger              Skin:  No abrasions, erythema, or rashes noted. Neurological:  Alert and oriented. Motor functions intact. Test Results Section   Imaging: All Radiology results interpreted by Radiologist unless otherwise noted. Xr Hand Right (min 3 Views)    Result Date: 3/3/2021  EXAMINATION: THREE XRAY VIEWS OF THE RIGHT HAND 3/3/2021 9:30 am COMPARISON: March 25, 2016 HISTORY: ORDERING SYSTEM PROVIDED HISTORY: Injury of right hand, initial encounter TECHNOLOGIST PROVIDED HISTORY: Reason for exam:->slammed right hand in door.  right pinky finger pain What reading provider will be dictating this exam?->CRC FINDINGS: New 5th metacarpal fracture with mild angulation vertex posteromedial.  There is adjacent soft tissue swelling. Normal mineralization. 5th metacarpal fracture with mild angulation. Assessment / Plan   Impression(s):  Tana Gambino was seen today for hand pain. Diagnoses and all orders for this visit:    Closed fracture of fifth metacarpal bone of right hand, unspecified fracture morphology, initial encounter  -     Maksim 1, DO [facility], [address]    Injury of right hand, initial encounter  -     XR HAND RIGHT (MIN 3 VIEWS); Future    X-ray of right hand obtained at hospital showing fifth metacarpal fracture with mild angulation. I did speak with DURGA Pandya, with [facility] regarding fracture and they will see him today for splinting and further evaluation/treatment. Patient to continue following up with orthopedics for further evaluation and treatment. ER sooner if symptoms persist, worsen, or change. Red flag symptoms discussed. All questions answered. Return if symptoms worsen or fail to improve. Electronically signed by Myra Moritz, APRN - CNP   DD: 3/3/21     **This report was transcribed using voice recognition software. Every effort was made to ensure accuracy; however, inadvertent computerized transcription errors may be present.

## 2021-03-03 NOTE — PATIENT INSTRUCTIONS
No heavy use of right hand  Ulnar gutter splint applied today, goal is to prevent fracture from moving further, which could displace to the point where we recommend surgery  OK to continue with ice and elevation  Tylenol/Ibuprofen as needed for soreness    Plan for follow up in 2 weeks with new xrays  As long as no further displacement we can transition out of splint    Please call the office at [phone] or send [employer] message to providers sooner with any questions or concerns  Strongly recommend all of our patients sign up for [employer] in order to have direct communication VIA [employer] TAMMIE with our clinic staff.

## 2021-03-03 NOTE — PROGRESS NOTES
Patient presents today after injury to right hand digit #5 DOI  2-. Went to [facility] clinic for medical attention today. Utilized a thumb spica splint her purchased for his \"pinky\". \"Just turned it around to stabilize the hand.

## 2021-03-05 NOTE — PROGRESS NOTES
New Patient Orthopaedic Progress Note    Freddy Crawford is a 41 y.o. 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 Allergies: Patient has no known allergies.   Past Medical History:   Diagnosis Date    Carpal tunnel syndrome on both sides, chronic     DeQuervain's disease (tenosynovitis) 3/21/2011    Diarrhea     Finger laceration 3/21/2011     Past Surgical History:   Procedure Laterality Date    CARPAL TUNNEL RELEASE      CYST REMOVAL  2007    Spermcidal    FINGER FRACTURE SURGERY  1990    HEMORRHOID SURGERY  02/20/2015    ULNAR TUNNEL RELEASE      VASECTOMY  2007     Family History   Problem Relation Age of Onset    Osteoarthritis Neg Hx     Rheum Arthritis Neg Hx     Asthma Neg Hx     Breast Cancer Neg Hx     Cancer Neg Hx     Depression Neg Hx     Diabetes Neg Hx     Heart Failure Neg Hx     High Cholesterol Neg Hx     Hypertension Neg Hx     Migraines Neg Hx     Rashes/Skin Problems Neg Hx     Seizures Neg Hx     Stroke Neg Hx     Thyroid Disease Neg Hx      Social History     Tobacco Use    Smoking status: Light Tobacco Smoker     Packs/day: 0.25     Years: 12.00     Pack years: 3.00     Types: Cigarettes, Cigars    Smokeless tobacco: Never Used   Substance Use Topics    Alcohol distribution         Temp 98.1 °F (36.7 °C) (Oral)      XR: 3V of the right hand that were obtained that demonstrates a fracture of the 5th metacarpal neck, mildly displaced but alignment acceptable. No other acute osseous abnormality    ASSESSMENT:     Diagnosis Orders   1. Closed displaced fracture of neck of fifth metacarpal bone of right hand, initial encounter         Discussion: Had lengthy discussion with patient regarding His diagnosis, typical prognosis, and expected outcomes. I reviewed the possible complications from the injury itself despite treatment choosen. We discussed that fracture could displace further, discussed if malrotated this can impact use of  on dominant hand significantly. Patient expressed understanding and is agreeable to immobilization. PLAN:  No heavy use of right hand  Ulnar gutter splint applied today, goal is to prevent fracture from moving further, which could displace to the point where we recommend surgery  OK to continue with ice and elevation  Tylenol/Ibuprofen as needed for soreness    Plan for follow up in 2 weeks with new xrays  As long as no further displacement we can transition out of splint    Electronically signed by Susan Alarcon PA-C on 3/5/2021 at 9:48 AM  Note: This report was completed using [employer] voiced recognition software. Every effort has been made to ensure accuracy; however, inadvertent computerized transcription errors may be present.

## 2021-03-12 DIAGNOSIS — S62.336A CLOSED DISPLACED FRACTURE OF NECK OF FIFTH METACARPAL BONE OF RIGHT HAND, INITIAL ENCOUNTER: Primary | ICD-10-CM

## 2021-03-23 ENCOUNTER — TELEPHONE (OUTPATIENT)
Dept: ORTHOPEDIC SURGERY | Age: 46
End: 2021-03-23

## 2021-03-24 ENCOUNTER — TELEPHONE (OUTPATIENT)
Dept: ORTHOPEDIC SURGERY | Age: 46
End: 2021-03-24

## 2021-03-24 ENCOUNTER — HOSPITAL ENCOUNTER (OUTPATIENT)
Age: 46
Discharge: HOME | End: 2021-03-26
Payer: COMMERCIAL

## 2021-03-24 ENCOUNTER — HOSPITAL ENCOUNTER (OUTPATIENT)
Dept: GENERAL RADIOLOGY | Age: 46
Discharge: HOME | End: 2021-03-26
Payer: COMMERCIAL

## 2021-03-24 DIAGNOSIS — S62.336A CLOSED DISPLACED FRACTURE OF NECK OF FIFTH METACARPAL BONE OF RIGHT HAND, INITIAL ENCOUNTER: ICD-10-CM

## 2021-03-24 PROCEDURE — 73130 X-RAY EXAM OF HAND: CPT

## 2021-03-24 NOTE — TELEPHONE ENCOUNTER
Call placed to pt after reviewing XR's with Tee Caicedo PA-C. Appt made for 4/2 at 10:00. Pt verbally confirmed appt date and time. Instructed to continue to wear splint. Pt verbalized understanding.

## 2021-03-26 DIAGNOSIS — S62.336A CLOSED DISPLACED FRACTURE OF NECK OF FIFTH METACARPAL BONE OF RIGHT HAND, INITIAL ENCOUNTER: Primary | ICD-10-CM

## 2021-04-02 ENCOUNTER — HOSPITAL ENCOUNTER (OUTPATIENT)
Dept: GENERAL RADIOLOGY | Age: 46
Discharge: HOME | End: 2021-04-04
Payer: COMMERCIAL

## 2021-04-02 ENCOUNTER — OFFICE VISIT (OUTPATIENT)
Dept: ORTHOPEDIC SURGERY | Age: 46
End: 2021-04-02
Payer: COMMERCIAL

## 2021-04-02 VITALS — TEMPERATURE: 98.1 F

## 2021-04-02 DIAGNOSIS — S62.336A CLOSED DISPLACED FRACTURE OF NECK OF FIFTH METACARPAL BONE OF RIGHT HAND, INITIAL ENCOUNTER: Primary | ICD-10-CM

## 2021-04-02 DIAGNOSIS — S62.336A CLOSED DISPLACED FRACTURE OF NECK OF FIFTH METACARPAL BONE OF RIGHT HAND, INITIAL ENCOUNTER: ICD-10-CM

## 2021-04-02 PROCEDURE — 29125 APPL SHORT ARM SPLINT STATIC: CPT

## 2021-04-02 PROCEDURE — 73130 X-RAY EXAM OF HAND: CPT

## 2021-04-02 PROCEDURE — 99212 OFFICE O/P EST SF 10 MIN: CPT

## 2021-04-02 PROCEDURE — 99024 POSTOP FOLLOW-UP VISIT: CPT | Performed by: PHYSICIAN ASSISTANT

## 2021-04-02 NOTE — PROGRESS NOTES
Chief Complaint   Patient presents with    Follow-up     Closed displaced fracture of neck of fifth metacarpal bone of right hand       SUBJECTIVE: Slava Bhatti presents today for a follow-up visit. He is now approximately 5 weeks out from sustaining a right fifth metacarpal fracture treated conservatively. He complains of only mild soreness in his hand. He has been wearing a makeshift brace on his right hand. He has been working on range of motion and strengthening exercises at home for his hand and digits. He denies numbness or tingling in the hand or fingers. Review of Systems -   General ROS: negative for - chills, fatigue, fever or night sweats  Respiratory ROS: no cough, shortness of breath, or wheezing  Cardiovascular ROS: no chest pain or dyspnea on exertion  Gastrointestinal ROS: no abdominal pain, change in bowel habits, or black or bloody stools  Genitourinary: no hematuria, dysuria, or incontinence   Musculoskeletal ROS:see above  Neurological ROS: no TIA or stroke symptoms       OBJECTIVE:   Alert and oriented X 3, no acute distress, respirations easy and unlabored with no audible wheezes, skin warm and dry, speech and dress appropriate for noted age, affect euthymic. Extremity:  Right Upper Extremity  Skin is clean dry and intact  no edema noted  Radial pulse palpable, fingers warm with BCR  Flex/extension intact to wrist, thumb and fingers  Finger opposition intact  Finger adduction/abduction intact  Finger crossover intact  Subjectively states sensation intact to radial/medial/ulnar distribution  There is mild weakness with extension of the right pinky finger at the MP and PIP joints. Good  strength. He is able to make a fist with no rotational deformity or finger overlapping. XR: 4/2/21 Right hand x-rays taken in the office today show stable alignment of the fifth metacarpal fracture extending to the distal articular surface with some angulation.   There is some early callus

## 2021-04-23 ENCOUNTER — OFFICE VISIT (OUTPATIENT)
Dept: PRIMARY CARE CLINIC | Age: 46
End: 2021-04-23
Payer: COMMERCIAL

## 2021-04-23 VITALS
RESPIRATION RATE: 18 BRPM | TEMPERATURE: 97.4 F | SYSTOLIC BLOOD PRESSURE: 138 MMHG | HEART RATE: 71 BPM | OXYGEN SATURATION: 99 % | HEIGHT: 74 IN | DIASTOLIC BLOOD PRESSURE: 87 MMHG | BODY MASS INDEX: 22.59 KG/M2 | WEIGHT: 176 LBS

## 2021-04-23 DIAGNOSIS — G89.29 CHRONIC BILATERAL LOW BACK PAIN WITHOUT SCIATICA: ICD-10-CM

## 2021-04-23 DIAGNOSIS — N34.2 URETHRITIS: Primary | ICD-10-CM

## 2021-04-23 DIAGNOSIS — M54.50 CHRONIC BILATERAL LOW BACK PAIN WITHOUT SCIATICA: ICD-10-CM

## 2021-04-23 DIAGNOSIS — N34.2 URETHRITIS: ICD-10-CM

## 2021-04-23 LAB
BACTERIA: ABNORMAL /HPF
BILIRUBIN URINE: NEGATIVE
BILIRUBIN, POC: NORMAL
BLOOD URINE, POC: NORMAL
BLOOD, URINE: NEGATIVE
CLARITY, POC: CLEAR
CLARITY: CLEAR
COLOR, POC: YELLOW
COLOR: YELLOW
GLUCOSE URINE, POC: NORMAL
GLUCOSE URINE: NEGATIVE MG/DL
KETONES, POC: NORMAL
KETONES, URINE: NEGATIVE MG/DL
LEUKOCYTE EST, POC: NORMAL
LEUKOCYTE ESTERASE, URINE: ABNORMAL
NITRITE, POC: NORMAL
NITRITE, URINE: NEGATIVE
PH UA: 6 (ref 5–9)
PH, POC: 65
PROTEIN UA: NEGATIVE MG/DL
PROTEIN, POC: NORMAL
RBC UA: ABNORMAL /HPF (ref 0–2)
SPECIFIC GRAVITY UA: >=1.03 (ref 1–1.03)
SPECIFIC GRAVITY, POC: >=1.03
UROBILINOGEN, POC: NORMAL
UROBILINOGEN, URINE: 0.2 E.U./DL
WBC UA: ABNORMAL /HPF (ref 0–5)

## 2021-04-23 PROCEDURE — 81003 URINALYSIS AUTO W/O SCOPE: CPT | Performed by: FAMILY MEDICINE

## 2021-04-23 PROCEDURE — 99213 OFFICE O/P EST LOW 20 MIN: CPT | Performed by: FAMILY MEDICINE

## 2021-04-23 RX ORDER — MELOXICAM 15 MG/1
15 TABLET ORAL DAILY
Qty: 30 TABLET | Refills: 5 | Status: SHIPPED
Start: 2021-04-23 | End: 2022-03-04 | Stop reason: SDUPTHER

## 2021-04-23 NOTE — PROGRESS NOTES
Fortunato Quiroz  : 1975    Chief Complaint:     Chief Complaint   Patient presents with    Sexually Transmitted Diseases     has pinching sensation/sharp pain      HPI  Sharp, pinching pain in penis for about 1 week. Feels pain mid-shaft in urethra. No discharge. No real dysuria. No groin/perineal pain. No skin changes or rash. Sexually active reportedly with gf only. He did change his soap recently, possibly chemical. No lubricant use. Past medical, surgical, family and social histories reviewed and updated today as appropriate    Health Maintenance Due   Topic Date Due    Pneumococcal 0-64 years Vaccine (1 of 1 - PPSV23) Never done    COVID-19 Vaccine (1) Never done       Current Outpatient Medications   Medication Sig Dispense Refill    meloxicam (MOBIC) 15 MG tablet Take 1 tablet by mouth daily 30 tablet 5    traZODone (DESYREL) 100 MG tablet Take 1 tablet by mouth nightly 90 tablet 1     No current facility-administered medications for this visit. No Known Allergies      REVIEW OF SYSTEMS  Review of Systems   Constitutional: Negative for activity change, appetite change, chills, diaphoresis, fatigue, fever and unexpected weight change. Gastrointestinal: Negative for abdominal pain, blood in stool, constipation, diarrhea, nausea and vomiting. Genitourinary: Positive for penile pain. Negative for decreased urine volume, difficulty urinating, discharge, dysuria, enuresis, flank pain, frequency, genital sores, hematuria, penile swelling, scrotal swelling, testicular pain and urgency. Skin: Negative for color change, pallor and rash. All other systems reviewed and are negative. PHYSICAL EXAM  /87   Pulse 71   Temp 97.4 °F (36.3 °C) (Oral)   Resp 18   Ht 6' 2\" (1.88 m)   Wt 176 lb (79.8 kg)   SpO2 99%   BMI 22.60 kg/m²   Physical Exam  Abdominal:      General: There is no distension. Palpations: There is no mass. Tenderness:  There is no abdominal tenderness. There is no right CVA tenderness, left CVA tenderness, guarding or rebound. Hernia: No hernia is present. There is no hernia in the left inguinal area or right inguinal area. Genitourinary:     Pubic Area: No rash. Penis: Normal and circumcised. No phimosis, hypospadias, erythema, tenderness, discharge, swelling or lesions. Testes: Normal.         Right: Mass, tenderness, swelling, testicular hydrocele or varicocele not present. Left: Mass, tenderness, swelling, testicular hydrocele or varicocele not present. Epididymis:      Right: Normal.      Left: Normal.   Lymphadenopathy:      Lower Body: Right inguinal adenopathy present. Left inguinal adenopathy present. ASSESSMENT/PLAN:     Diagnosis Orders   1. Urethritis  POCT Urinalysis No Micro (Auto)    Urinalysis    Culture, Urine    C.TRACHOMATIS N.GONORRHOEAE DNA,URINE    TRICHOMONAS SCREEN   2. Chronic bilateral low back pain without sciatica  meloxicam (MOBIC) 15 MG tablet     Check labs, wants to wait on results first. Suspect chlamydia with inguinal LAD. If negative, assume chemical urethritis. Reviewed age and gender appropriate health screening exams and vaccinations. Advisedpatient regarding importance of keeping up with recommended health maintenance and to schedule as soon as possible if overdue, as this is important in assessing for undiagnosed pathology, especially cancer. Patientverbalizes understanding and agrees. Call or go to ED immediately if symptoms worsen or persist.  No follow-ups on file. Sooner if necessary. Counseled regarding above diagnosis, including possible risks and complications,especially if left uncontrolled. Counseled regarding the possible side effects, risks, benefits and alternatives to treatment; patient and/or guardian verbalizes understanding. Advised patient to call with any newmedication issues. All questions answered.     Ankita Espinoza, MD  4/23/21

## 2021-04-26 LAB — URINE CULTURE, ROUTINE: NORMAL

## 2021-04-27 ENCOUNTER — TELEPHONE (OUTPATIENT)
Dept: PRIMARY CARE CLINIC | Age: 46
End: 2021-04-27

## 2021-04-27 DIAGNOSIS — A74.9 CHLAMYDIA INFECTION: Primary | ICD-10-CM

## 2021-04-27 LAB
C. TRACHOMATIS DNA ,URINE: ABNORMAL
N. GONORRHOEAE DNA, URINE: NEGATIVE
SOURCE: ABNORMAL

## 2021-04-27 RX ORDER — DOXYCYCLINE HYCLATE 100 MG
100 TABLET ORAL 2 TIMES DAILY WITH MEALS
Qty: 14 TABLET | Refills: 0 | Status: SHIPPED | OUTPATIENT
Start: 2021-04-27 | End: 2021-05-04

## 2021-04-28 LAB — CULTURE, TRICHOMONAS: NORMAL

## 2021-05-07 DIAGNOSIS — S62.336A CLOSED DISPLACED FRACTURE OF NECK OF FIFTH METACARPAL BONE OF RIGHT HAND, INITIAL ENCOUNTER: Primary | ICD-10-CM

## 2021-05-12 ENCOUNTER — HOSPITAL ENCOUNTER (OUTPATIENT)
Dept: GENERAL RADIOLOGY | Age: 46
Discharge: HOME | End: 2021-05-14
Payer: COMMERCIAL

## 2021-05-12 ENCOUNTER — OFFICE VISIT (OUTPATIENT)
Dept: ORTHOPEDIC SURGERY | Age: 46
End: 2021-05-12
Payer: COMMERCIAL

## 2021-05-12 VITALS — TEMPERATURE: 98.2 F

## 2021-05-12 DIAGNOSIS — S62.336A CLOSED DISPLACED FRACTURE OF NECK OF FIFTH METACARPAL BONE OF RIGHT HAND, INITIAL ENCOUNTER: ICD-10-CM

## 2021-05-12 DIAGNOSIS — S62.336A CLOSED DISPLACED FRACTURE OF NECK OF FIFTH METACARPAL BONE OF RIGHT HAND, INITIAL ENCOUNTER: Primary | ICD-10-CM

## 2021-05-12 PROCEDURE — 73130 X-RAY EXAM OF HAND: CPT

## 2021-05-12 PROCEDURE — 99024 POSTOP FOLLOW-UP VISIT: CPT | Performed by: ORTHOPAEDIC SURGERY

## 2021-05-12 PROCEDURE — 99212 OFFICE O/P EST SF 10 MIN: CPT

## 2021-05-18 ASSESSMENT — ENCOUNTER SYMPTOMS
CONSTIPATION: 0
DIARRHEA: 0
NAUSEA: 0
ABDOMINAL PAIN: 0
VOMITING: 0
BLOOD IN STOOL: 0
COLOR CHANGE: 0

## 2021-07-29 ENCOUNTER — OFFICE VISIT (OUTPATIENT)
Dept: SURGERY | Age: 46
End: 2021-07-29
Payer: COMMERCIAL

## 2021-07-29 ENCOUNTER — TELEPHONE (OUTPATIENT)
Dept: SURGERY | Age: 46
End: 2021-07-29

## 2021-07-29 VITALS
RESPIRATION RATE: 18 BRPM | DIASTOLIC BLOOD PRESSURE: 77 MMHG | WEIGHT: 176 LBS | SYSTOLIC BLOOD PRESSURE: 125 MMHG | TEMPERATURE: 97.4 F | HEART RATE: 70 BPM | BODY MASS INDEX: 22.59 KG/M2 | HEIGHT: 74 IN | OXYGEN SATURATION: 96 %

## 2021-07-29 DIAGNOSIS — K64.2 GRADE III HEMORRHOIDS: Primary | ICD-10-CM

## 2021-07-29 PROCEDURE — 99213 OFFICE O/P EST LOW 20 MIN: CPT | Performed by: SURGERY

## 2021-07-29 NOTE — TELEPHONE ENCOUNTER
Prior Authorization Form:      DEMOGRAPHICS:                     Patient Name:  Sarita Angulo  Patient :  1975            Insurance:  Payor: Robinson Goldberg / Plan: Robinson Goldberg - [place] PPO / Product Type: *No Product type* /   Insurance ID Number:    Payor/Plan Subscr  Sex Relation Sub. Ins. ID Effective Group Num   1.  Canelo GANT 1975 Male Self [id number] 4/15/18 [id number]                                    [id number]         DIAGNOSIS & PROCEDURE:                       Procedure/Operation: hemorrhoidectomy           CPT Code: 31469    Diagnosis:  hemorrhoids    ICD10 Code: K64.4    Location:  [address] [place] [address]    Surgeon:  Lisset Hopper INFORMATION:                          Date: 2021    Time:               Anesthesia:                                                         Status:  Outpatient        Special Comments:           Electronically signed by Joe Jorgensen MA on 2021 at 1:41 PM

## 2021-07-29 NOTE — PROGRESS NOTES
General Surgery History and Physical    Patient's Name/Date of Birth: Tano Marin / 1975    Date: July 29, 2021     Surgeon: Merrill Sandoval M.D.    PCP: Keith Flores MD     Chief Complaint: hemorrhoids    HPI:   Tano Marin is a 40 y.o. male who presents for evaluation of hemorrhoid that are bleeding. They have been present for years, is  having daily bms, does  take stool softener and does not fiber. The patient has  had medicines to treat the hemorrhoids. The hemorrhoids cause some pain,  And significant bleeding. Past Medical History:   Diagnosis Date    Carpal tunnel syndrome on both sides, chronic     DeQuervain's disease (tenosynovitis) 3/21/2011    Diarrhea     Finger laceration 3/21/2011       Past Surgical History:   Procedure Laterality Date    CARPAL TUNNEL RELEASE      CYST REMOVAL  2007    Spermcidal    FINGER FRACTURE SURGERY  1990    HEMORRHOID SURGERY  02/20/2015    ULNAR TUNNEL RELEASE      VASECTOMY  2007       Current Outpatient Medications   Medication Sig Dispense Refill    meloxicam (MOBIC) 15 MG tablet Take 1 tablet by mouth daily 30 tablet 5    traZODone (DESYREL) 100 MG tablet Take 1 tablet by mouth nightly 90 tablet 1     No current facility-administered medications for this visit. No Known Allergies    The patient has a family history that is negative for severe cardiovascular or respiratory issues, negative for reaction to anesthesia. Social History     Socioeconomic History    Marital status: Single     Spouse name: Not on file    Number of children: Not on file    Years of education: Not on file    Highest education level: Not on file   Occupational History    Not on file   Tobacco Use    Smoking status: Light Tobacco Smoker     Packs/day: 0.25     Years: 12.00     Pack years: 3.00     Types: Cigarettes, Cigars    Smokeless tobacco: Never Used   Substance and Sexual Activity    Alcohol use:  Yes     Alcohol/week: 2.0 standard drinks Types: 2 Cans of beer per week     Comment: social    Drug use: Yes     Types: Marijuana     Comment: used to smoke marijuana/ said he only did monday to stop the nausea.  Sexual activity: Not on file   Other Topics Concern    Not on file   Social History Narrative    ** Merged History Encounter **          Social Determinants of Health     Financial Resource Strain:     Difficulty of Paying Living Expenses:    Food Insecurity:     Worried About Running Out of Food in the Last Year:     Ran Out of Food in the Last Year:    Transportation Needs:     Lack of Transportation (Medical):      Lack of Transportation (Non-Medical):    Physical Activity:     Days of Exercise per Week:     Minutes of Exercise per Session:    Stress:     Feeling of Stress :    Social Connections:     Frequency of Communication with Friends and Family:     Frequency of Social Gatherings with Friends and Family:     Attends Zoroastrian Services:     Active Member of Clubs or Organizations:     Attends Club or Organization Meetings:     Marital Status:    Intimate Partner Violence:     Fear of Current or Ex-Partner:     Emotionally Abused:     Physically Abused:     Sexually Abused:            Review of Systems  Review of Systems -  General ROS: negative for - chills, fatigue or malaise  ENT ROS: negative for - hearing change, nasal congestion or nasal discharge  Allergy and Immunology ROS: negative for - hives, itchy/watery eyes or nasal congestion  Hematological and Lymphatic ROS: negative for - blood clots, blood transfusions, bruising or fatigue  Endocrine ROS: negative for - malaise/lethargy, mood swings, palpitations or polydipsia/polyuria  Breast ROS: negative for - new or changing breast lumps or nipple changes  Respiratory ROS: negative for - sputum changes, stridor, tachypnea or wheezing  Cardiovascular ROS: negative for - irregular heartbeat, loss of consciousness, murmur or orthopnea  Gastrointestinal ROS: negative for - constipation, diarrhea, gas/bloating, heartburn or hematemesis, positive for hemorroids with intermittent bleeding and pain  Genito-Urinary ROS: negative for -  genital discharge, genital ulcers or hematuria  Musculoskeletal ROS: negative for - gait disturbance, muscle pain or muscular weakness    Physical exam:   /77 (Site: Right Upper Arm, Position: Sitting, Cuff Size: Medium Adult)   Pulse 70   Temp 97.4 °F (36.3 °C) (Temporal)   Resp 18   Ht 6' 2\" (1.88 m)   Wt 176 lb (79.8 kg)   SpO2 96%   BMI 22.60 kg/m²   General appearance:  NAD  Pyscho/social: negative for tremors and hallucinations  Head: NCAT, PERRLA, EOMI, red conjunctiva  Neck: supple, no masses  Lungs: CTAB, equal chest rise bilateral  Heart: Reg rate  Abdomen: soft, nontender, nondistended  Rectal: internal hemorrhoids, non inflamed, non thrombosed. Skin; no lesions  Gu: no cva tenderness  Extremities: extremities normal, atraumatic, no cyanosis or edema      Assessment:  40 y.o. male with hemorrhoids    Plan:  High fiber diet, stool softener, prn laxative for daily bm and hemorrhoidectomy.   Tamy Davis MD  11:01 AM  7/29/2021

## 2021-07-30 ENCOUNTER — PREP FOR PROCEDURE (OUTPATIENT)
Dept: SURGERY | Age: 46
End: 2021-07-30

## 2021-07-30 RX ORDER — SODIUM CHLORIDE 9 MG/ML
25 INJECTION, SOLUTION INTRAVENOUS PRN
Status: CANCELLED | OUTPATIENT
Start: 2021-07-30

## 2021-07-30 RX ORDER — SODIUM CHLORIDE 0.9 % (FLUSH) 0.9 %
10 SYRINGE (ML) INJECTION EVERY 12 HOURS SCHEDULED
Status: CANCELLED | OUTPATIENT
Start: 2021-07-30

## 2021-07-30 RX ORDER — SODIUM CHLORIDE 0.9 % (FLUSH) 0.9 %
10 SYRINGE (ML) INJECTION PRN
Status: CANCELLED | OUTPATIENT
Start: 2021-07-30

## 2021-08-05 NOTE — PROGRESS NOTES
[address] [facility]                                                                                                                    PRE OP INSTRUCTIONS FOR  Sindi Paniagua        Date: 8/5/2021    Date of surgery: 8/6/2021   Arrival Time: Hospital will call you between 5pm and 7pm with your final arrival time for surgery    1. Do not eat or drink anything after 12 prior to surgery. This includes no water, chewing gum, mints or ice chips. 2. Take the following medications with a small sip of water on the morning of Surgery: none     3. Diabetics may take evening dose of insulin but none after midnight. If you feel symptomatic or low blood sugar morning of surgery drink 1-2 ounces of apple juice only. 4. Aspirin, Ibuprofen, Advil, Naproxen, Vitamin E and other Anti-inflammatory products should be stopped  before surgery  as directed by your physician. Take Tylenol only unless instructed otherwise by your surgeon. 5. Check with your Doctor regarding stopping Plavix, Coumadin, Lovenox, Eliquis, Effient, or other blood thinners. 6. Do not smoke,use illicit drugs and do not drink any alcoholic beverages 24 hours prior to surgery. 7. You may brush your teeth the morning of surgery. DO NOT SWALLOW WATER    8. You MUST make arrangements for a responsible adult to take you home after your surgery. You will not be allowed to leave alone or drive yourself home. It is strongly suggested someone stay with you the first 24 hrs. Your surgery will be cancelled if you do not have a ride home. 9. PEDIATRIC PATIENTS ONLY:  A parent/legal guardian must accompany a child scheduled for surgery and plan to stay at the hospital until the child is discharged. Please do not bring other children with you.     10. Please wear simple, loose fitting clothing to the hospital.  Briana Limons not bring valuables (money, credit cards, checkbooks, etc.) Do not wear any makeup (including no eye makeup) or nail polish on your fingers or toes. 11. DO NOT wear any jewelry or piercings on day of surgery. All body piercing jewelry must be removed. 12. Shower the night before surgery with ___Antibacterial soap /FAYE WIPES________    13. TOTAL JOINT REPLACEMENT/HYSTERECTOMY PATIENTS ONLY---Remember to bring Blood Bank bracelet to the hospital on the day of surgery. 14. If you have a Living Will and Durable Power of  for Healthcare, please bring in a copy. 15. If appropriate bring crutches, inspirex, WALKER, CANE etc... 12. Notify your Surgeon if you develop any illness between now and surgery time, cough, cold, fever, sore throat, nausea, vomiting, etc.  Please notify your surgeon if you experience dizziness, shortness of breath or blurred vision between now & the time of your surgery. 17. If you have ___dentures, they will be removed before going to the OR; we will provide you a container. If you wear ___contact lenses or ___glasses, they will be removed; please bring a case for them. 18. To provide excellent care visitors will be limited to 2 in the room at any given time. 19. Please bring picture ID and insurance card. 20. Sleep apnea patients need to bring CPAP AND SETTINGS to hospital on day of surgery. 21. During flu season no children under the age of 15 are permitted in the hospital for the safety of all patients. 22. Other                  Please call AMBULATORY CARE if you have any further questions.    [address] [facility]     [address]

## 2021-08-06 ENCOUNTER — ANESTHESIA EVENT (OUTPATIENT)
Dept: OPERATING ROOM | Age: 46
End: 2021-08-06
Payer: COMMERCIAL

## 2021-08-06 ENCOUNTER — ANESTHESIA (OUTPATIENT)
Dept: OPERATING ROOM | Age: 46
End: 2021-08-06
Payer: COMMERCIAL

## 2021-08-06 ENCOUNTER — HOSPITAL ENCOUNTER (OUTPATIENT)
Age: 46
Setting detail: OUTPATIENT SURGERY
Discharge: HOME | End: 2021-08-06
Attending: SURGERY | Admitting: SURGERY
Payer: COMMERCIAL

## 2021-08-06 VITALS
DIASTOLIC BLOOD PRESSURE: 82 MMHG | BODY MASS INDEX: 22.59 KG/M2 | WEIGHT: 176 LBS | OXYGEN SATURATION: 98 % | HEIGHT: 74 IN | SYSTOLIC BLOOD PRESSURE: 131 MMHG | RESPIRATION RATE: 16 BRPM | HEART RATE: 54 BPM | TEMPERATURE: 97.8 F

## 2021-08-06 VITALS
TEMPERATURE: 93.9 F | DIASTOLIC BLOOD PRESSURE: 110 MMHG | OXYGEN SATURATION: 100 % | SYSTOLIC BLOOD PRESSURE: 155 MMHG | RESPIRATION RATE: 2 BRPM

## 2021-08-06 DIAGNOSIS — G89.18 POST-OP PAIN: Primary | ICD-10-CM

## 2021-08-06 PROCEDURE — 46260 REMOVE IN/EX HEM GROUPS 2+: CPT | Performed by: SURGERY

## 2021-08-06 PROCEDURE — 2709999900 HC NON-CHARGEABLE SUPPLY: Performed by: SURGERY

## 2021-08-06 PROCEDURE — 99024 POSTOP FOLLOW-UP VISIT: CPT | Performed by: SURGERY

## 2021-08-06 PROCEDURE — 2500000003 HC RX 250 WO HCPCS: Performed by: SURGERY

## 2021-08-06 PROCEDURE — 7100000011 HC PHASE II RECOVERY - ADDTL 15 MIN: Performed by: SURGERY

## 2021-08-06 PROCEDURE — 2720000010 HC SURG SUPPLY STERILE: Performed by: SURGERY

## 2021-08-06 PROCEDURE — 3700000000 HC ANESTHESIA ATTENDED CARE: Performed by: SURGERY

## 2021-08-06 PROCEDURE — 3600000002 HC SURGERY LEVEL 2 BASE: Performed by: SURGERY

## 2021-08-06 PROCEDURE — 2500000003 HC RX 250 WO HCPCS: Performed by: NURSE ANESTHETIST, CERTIFIED REGISTERED

## 2021-08-06 PROCEDURE — 6360000002 HC RX W HCPCS

## 2021-08-06 PROCEDURE — 3700000001 HC ADD 15 MINUTES (ANESTHESIA): Performed by: SURGERY

## 2021-08-06 PROCEDURE — 3600000012 HC SURGERY LEVEL 2 ADDTL 15MIN: Performed by: SURGERY

## 2021-08-06 PROCEDURE — 6370000000 HC RX 637 (ALT 250 FOR IP): Performed by: ANESTHESIOLOGY

## 2021-08-06 PROCEDURE — 6360000002 HC RX W HCPCS: Performed by: ANESTHESIOLOGY

## 2021-08-06 PROCEDURE — 7100000001 HC PACU RECOVERY - ADDTL 15 MIN: Performed by: SURGERY

## 2021-08-06 PROCEDURE — 7100000010 HC PHASE II RECOVERY - FIRST 15 MIN: Performed by: SURGERY

## 2021-08-06 PROCEDURE — 7100000000 HC PACU RECOVERY - FIRST 15 MIN: Performed by: SURGERY

## 2021-08-06 PROCEDURE — 6360000002 HC RX W HCPCS: Performed by: NURSE ANESTHETIST, CERTIFIED REGISTERED

## 2021-08-06 PROCEDURE — 2580000003 HC RX 258: Performed by: ANESTHESIOLOGY

## 2021-08-06 PROCEDURE — 6370000000 HC RX 637 (ALT 250 FOR IP): Performed by: SURGERY

## 2021-08-06 PROCEDURE — 88304 TISSUE EXAM BY PATHOLOGIST: CPT

## 2021-08-06 RX ORDER — LIDOCAINE HYDROCHLORIDE 20 MG/ML
INJECTION, SOLUTION INTRAVENOUS PRN
Status: DISCONTINUED | OUTPATIENT
Start: 2021-08-06 | End: 2021-08-06 | Stop reason: SDUPTHER

## 2021-08-06 RX ORDER — OXYCODONE HYDROCHLORIDE AND ACETAMINOPHEN 5; 325 MG/1; MG/1
1 TABLET ORAL EVERY 6 HOURS PRN
Qty: 28 TABLET | Refills: 0 | Status: SHIPPED | OUTPATIENT
Start: 2021-08-06 | End: 2021-08-13 | Stop reason: SDUPTHER

## 2021-08-06 RX ORDER — SODIUM CHLORIDE 0.9 % (FLUSH) 0.9 %
10 SYRINGE (ML) INJECTION PRN
Status: DISCONTINUED | OUTPATIENT
Start: 2021-08-06 | End: 2021-08-06 | Stop reason: HOSPADM

## 2021-08-06 RX ORDER — MEPERIDINE HYDROCHLORIDE 25 MG/ML
INJECTION INTRAMUSCULAR; INTRAVENOUS; SUBCUTANEOUS
Status: COMPLETED
Start: 2021-08-06 | End: 2021-08-06

## 2021-08-06 RX ORDER — MIDAZOLAM HYDROCHLORIDE 1 MG/ML
INJECTION INTRAMUSCULAR; INTRAVENOUS PRN
Status: DISCONTINUED | OUTPATIENT
Start: 2021-08-06 | End: 2021-08-06 | Stop reason: SDUPTHER

## 2021-08-06 RX ORDER — IBUPROFEN 800 MG/1
800 TABLET ORAL EVERY 6 HOURS PRN
Qty: 20 TABLET | Refills: 0 | Status: SHIPPED | OUTPATIENT
Start: 2021-08-06 | End: 2021-08-11 | Stop reason: SDUPTHER

## 2021-08-06 RX ORDER — SUCCINYLCHOLINE/SOD CL,ISO/PF 200MG/10ML
SYRINGE (ML) INTRAVENOUS PRN
Status: DISCONTINUED | OUTPATIENT
Start: 2021-08-06 | End: 2021-08-06 | Stop reason: SDUPTHER

## 2021-08-06 RX ORDER — OXYCODONE HYDROCHLORIDE AND ACETAMINOPHEN 5; 325 MG/1; MG/1
1 TABLET ORAL EVERY 6 HOURS PRN
Status: DISCONTINUED | OUTPATIENT
Start: 2021-08-06 | End: 2021-08-06 | Stop reason: HOSPADM

## 2021-08-06 RX ORDER — DEXAMETHASONE SODIUM PHOSPHATE 10 MG/ML
INJECTION, SOLUTION INTRAMUSCULAR; INTRAVENOUS PRN
Status: DISCONTINUED | OUTPATIENT
Start: 2021-08-06 | End: 2021-08-06 | Stop reason: SDUPTHER

## 2021-08-06 RX ORDER — PROPOFOL 10 MG/ML
INJECTION, EMULSION INTRAVENOUS PRN
Status: DISCONTINUED | OUTPATIENT
Start: 2021-08-06 | End: 2021-08-06 | Stop reason: SDUPTHER

## 2021-08-06 RX ORDER — SODIUM CHLORIDE 0.9 % (FLUSH) 0.9 %
10 SYRINGE (ML) INJECTION EVERY 12 HOURS SCHEDULED
Status: DISCONTINUED | OUTPATIENT
Start: 2021-08-06 | End: 2021-08-06 | Stop reason: HOSPADM

## 2021-08-06 RX ORDER — DIBUCAINE 0.28 G/28G
OINTMENT TOPICAL PRN
Status: DISCONTINUED | OUTPATIENT
Start: 2021-08-06 | End: 2021-08-06 | Stop reason: ALTCHOICE

## 2021-08-06 RX ORDER — MEPERIDINE HYDROCHLORIDE 25 MG/ML
12.5 INJECTION INTRAMUSCULAR; INTRAVENOUS; SUBCUTANEOUS EVERY 5 MIN PRN
Status: DISCONTINUED | OUTPATIENT
Start: 2021-08-06 | End: 2021-08-06 | Stop reason: HOSPADM

## 2021-08-06 RX ORDER — NEOSTIGMINE METHYLSULFATE 1 MG/ML
INJECTION, SOLUTION INTRAVENOUS PRN
Status: DISCONTINUED | OUTPATIENT
Start: 2021-08-06 | End: 2021-08-06 | Stop reason: SDUPTHER

## 2021-08-06 RX ORDER — KETOROLAC TROMETHAMINE 30 MG/ML
INJECTION, SOLUTION INTRAMUSCULAR; INTRAVENOUS PRN
Status: DISCONTINUED | OUTPATIENT
Start: 2021-08-06 | End: 2021-08-06 | Stop reason: SDUPTHER

## 2021-08-06 RX ORDER — SODIUM CHLORIDE, SODIUM LACTATE, POTASSIUM CHLORIDE, CALCIUM CHLORIDE 600; 310; 30; 20 MG/100ML; MG/100ML; MG/100ML; MG/100ML
INJECTION, SOLUTION INTRAVENOUS CONTINUOUS
Status: DISCONTINUED | OUTPATIENT
Start: 2021-08-06 | End: 2021-08-06 | Stop reason: HOSPADM

## 2021-08-06 RX ORDER — FENTANYL CITRATE 50 UG/ML
INJECTION, SOLUTION INTRAMUSCULAR; INTRAVENOUS PRN
Status: DISCONTINUED | OUTPATIENT
Start: 2021-08-06 | End: 2021-08-06 | Stop reason: SDUPTHER

## 2021-08-06 RX ORDER — ONDANSETRON 2 MG/ML
INJECTION INTRAMUSCULAR; INTRAVENOUS PRN
Status: DISCONTINUED | OUTPATIENT
Start: 2021-08-06 | End: 2021-08-06 | Stop reason: SDUPTHER

## 2021-08-06 RX ORDER — GLYCOPYRROLATE 1 MG/5 ML
SYRINGE (ML) INTRAVENOUS PRN
Status: DISCONTINUED | OUTPATIENT
Start: 2021-08-06 | End: 2021-08-06 | Stop reason: SDUPTHER

## 2021-08-06 RX ORDER — SODIUM CHLORIDE 9 MG/ML
25 INJECTION, SOLUTION INTRAVENOUS PRN
Status: DISCONTINUED | OUTPATIENT
Start: 2021-08-06 | End: 2021-08-06 | Stop reason: HOSPADM

## 2021-08-06 RX ORDER — BUPIVACAINE HYDROCHLORIDE AND EPINEPHRINE 2.5; 5 MG/ML; UG/ML
INJECTION, SOLUTION EPIDURAL; INFILTRATION; INTRACAUDAL; PERINEURAL PRN
Status: DISCONTINUED | OUTPATIENT
Start: 2021-08-06 | End: 2021-08-06 | Stop reason: ALTCHOICE

## 2021-08-06 RX ORDER — ROCURONIUM BROMIDE 10 MG/ML
INJECTION, SOLUTION INTRAVENOUS PRN
Status: DISCONTINUED | OUTPATIENT
Start: 2021-08-06 | End: 2021-08-06 | Stop reason: SDUPTHER

## 2021-08-06 RX ADMIN — FENTANYL CITRATE 50 MCG: 50 INJECTION, SOLUTION INTRAMUSCULAR; INTRAVENOUS at 07:58

## 2021-08-06 RX ADMIN — MEPERIDINE HYDROCHLORIDE 12.5 MG: 25 INJECTION, SOLUTION INTRAMUSCULAR; INTRAVENOUS; SUBCUTANEOUS at 09:03

## 2021-08-06 RX ADMIN — ROCURONIUM BROMIDE 10 MG: 10 SOLUTION INTRAVENOUS at 08:07

## 2021-08-06 RX ADMIN — LIDOCAINE HYDROCHLORIDE 100 MG: 20 INJECTION, SOLUTION INTRAVENOUS at 08:07

## 2021-08-06 RX ADMIN — PROPOFOL 50 MG: 10 INJECTION, EMULSION INTRAVENOUS at 08:19

## 2021-08-06 RX ADMIN — KETOROLAC TROMETHAMINE 30 MG: 30 INJECTION, SOLUTION INTRAMUSCULAR at 08:41

## 2021-08-06 RX ADMIN — SODIUM CHLORIDE, POTASSIUM CHLORIDE, SODIUM LACTATE AND CALCIUM CHLORIDE: 600; 310; 30; 20 INJECTION, SOLUTION INTRAVENOUS at 07:06

## 2021-08-06 RX ADMIN — PROPOFOL 50 MG: 10 INJECTION, EMULSION INTRAVENOUS at 08:16

## 2021-08-06 RX ADMIN — PROPOFOL 250 MG: 10 INJECTION, EMULSION INTRAVENOUS at 08:07

## 2021-08-06 RX ADMIN — DEXAMETHASONE SODIUM PHOSPHATE 10 MG: 10 INJECTION, SOLUTION INTRAMUSCULAR; INTRAVENOUS at 08:18

## 2021-08-06 RX ADMIN — Medication 3 MG: at 08:40

## 2021-08-06 RX ADMIN — FENTANYL CITRATE 100 MCG: 50 INJECTION, SOLUTION INTRAMUSCULAR; INTRAVENOUS at 08:18

## 2021-08-06 RX ADMIN — ROCURONIUM BROMIDE 20 MG: 10 SOLUTION INTRAVENOUS at 08:17

## 2021-08-06 RX ADMIN — Medication 0.2 MG: at 08:07

## 2021-08-06 RX ADMIN — MEPERIDINE HYDROCHLORIDE 12.5 MG: 25 INJECTION, SOLUTION INTRAMUSCULAR; INTRAVENOUS; SUBCUTANEOUS at 08:56

## 2021-08-06 RX ADMIN — MIDAZOLAM 2 MG: 1 INJECTION INTRAMUSCULAR; INTRAVENOUS at 07:54

## 2021-08-06 RX ADMIN — SODIUM CHLORIDE, POTASSIUM CHLORIDE, SODIUM LACTATE AND CALCIUM CHLORIDE: 600; 310; 30; 20 INJECTION, SOLUTION INTRAVENOUS at 08:41

## 2021-08-06 RX ADMIN — Medication 0.6 MG: at 08:40

## 2021-08-06 RX ADMIN — PROPOFOL 50 MG: 10 INJECTION, EMULSION INTRAVENOUS at 08:10

## 2021-08-06 RX ADMIN — OXYCODONE AND ACETAMINOPHEN 1 TABLET: 5; 325 TABLET ORAL at 10:54

## 2021-08-06 RX ADMIN — Medication 160 MG: at 08:07

## 2021-08-06 RX ADMIN — FENTANYL CITRATE 50 MCG: 50 INJECTION, SOLUTION INTRAMUSCULAR; INTRAVENOUS at 08:07

## 2021-08-06 RX ADMIN — ONDANSETRON 4 MG: 2 INJECTION INTRAMUSCULAR; INTRAVENOUS at 08:18

## 2021-08-06 ASSESSMENT — PULMONARY FUNCTION TESTS
PIF_VALUE: 22
PIF_VALUE: 21
PIF_VALUE: 23
PIF_VALUE: 22
PIF_VALUE: 16
PIF_VALUE: 20
PIF_VALUE: 1
PIF_VALUE: 24
PIF_VALUE: 20
PIF_VALUE: 6
PIF_VALUE: 15
PIF_VALUE: 22
PIF_VALUE: 22
PIF_VALUE: 21
PIF_VALUE: 16
PIF_VALUE: 20
PIF_VALUE: 6
PIF_VALUE: 21
PIF_VALUE: 22
PIF_VALUE: 15
PIF_VALUE: 2
PIF_VALUE: 20
PIF_VALUE: 17
PIF_VALUE: 22
PIF_VALUE: 20
PIF_VALUE: 13
PIF_VALUE: 7
PIF_VALUE: 16
PIF_VALUE: 21
PIF_VALUE: 22
PIF_VALUE: 1
PIF_VALUE: 18
PIF_VALUE: 16
PIF_VALUE: 22
PIF_VALUE: 21
PIF_VALUE: 3
PIF_VALUE: 10
PIF_VALUE: 20
PIF_VALUE: 18
PIF_VALUE: 22
PIF_VALUE: 21
PIF_VALUE: 22
PIF_VALUE: 9
PIF_VALUE: 19
PIF_VALUE: 22
PIF_VALUE: 2
PIF_VALUE: 20
PIF_VALUE: 20
PIF_VALUE: 22
PIF_VALUE: 49

## 2021-08-06 ASSESSMENT — PAIN SCALES - GENERAL
PAINLEVEL_OUTOF10: 3
PAINLEVEL_OUTOF10: 6
PAINLEVEL_OUTOF10: 6

## 2021-08-06 ASSESSMENT — PAIN DESCRIPTION - PAIN TYPE
TYPE: SURGICAL PAIN
TYPE: SURGICAL PAIN

## 2021-08-06 ASSESSMENT — PAIN DESCRIPTION - PROGRESSION
CLINICAL_PROGRESSION: NOT CHANGED
CLINICAL_PROGRESSION: GRADUALLY IMPROVING

## 2021-08-06 ASSESSMENT — PAIN DESCRIPTION - FREQUENCY
FREQUENCY: INTERMITTENT
FREQUENCY: INTERMITTENT

## 2021-08-06 ASSESSMENT — PAIN DESCRIPTION - LOCATION
LOCATION: RECTUM
LOCATION: RECTUM

## 2021-08-06 ASSESSMENT — LIFESTYLE VARIABLES: SMOKING_STATUS: 1

## 2021-08-06 ASSESSMENT — PAIN DESCRIPTION - DESCRIPTORS
DESCRIPTORS: DISCOMFORT;BURNING
DESCRIPTORS: DISCOMFORT;BURNING

## 2021-08-06 NOTE — ANESTHESIA POSTPROCEDURE EVALUATION
Department of Anesthesiology  Postprocedure Note    Patient: Hazen Schaumann  MRN: [id number]  YOB: 1975  Date of evaluation: 8/6/2021  Time:  11:44 AM     Procedure Summary     Date: 08/06/21 Room / Location: [address] [place] [address] [facility]    Anesthesia Start: 9706 Anesthesia Stop: 1330    Procedure: HEMORRHOIDECTOMY (N/A Anus) Diagnosis: (HEMORRHOIDS)    Surgeons: Lou Bennett MD Responsible Provider: Maritza Del Toro MD    Anesthesia Type: general ASA Status: 3          Anesthesia Type: general    Melita Phase I: Melita Score: 10    Melita Phase II:      Last vitals: Reviewed and per EMR flowsheets.        Anesthesia Post Evaluation    Patient location during evaluation: PACU  Patient participation: complete - patient participated  Level of consciousness: awake  Pain score: 0  Airway patency: patent  Nausea & Vomiting: no nausea  Complications: no  Cardiovascular status: blood pressure returned to baseline  Respiratory status: acceptable  Hydration status: euvolemic

## 2021-08-06 NOTE — ANESTHESIA PRE PROCEDURE
Department of Anesthesiology  Preprocedure Note       Name:  Owen Queen   Age:  40 y.o.  :  1975                                          MRN:  [id number]         Date:  2021      Surgeon: Tamanna Palma):  Mele Chau MD    Procedure: Procedure(s): HEMORRHOIDECTOMY    Medications prior to admission:   Prior to Admission medications    Medication Sig Start Date End Date Taking?  Authorizing Provider   meloxicam (MOBIC) 15 MG tablet Take 1 tablet by mouth daily 21  Yes Harinder Phillips MD   traZODone (DESYREL) 100 MG tablet Take 1 tablet by mouth nightly  Patient taking differently: Take 100 mg by mouth nightly as needed for Sleep  7/10/19  Yes Harinder Phillips MD       Current medications:    Current Facility-Administered Medications   Medication Dose Route Frequency Provider Last Rate Last Admin    lactated ringers infusion   Intravenous Continuous Shanthi Cuba  mL/hr at 21 0706 New Bag at 21 0706    0.9 % sodium chloride infusion  25 mL Intravenous PRN Mele Chau MD        ceFAZolin (ANCEF) 2000 mg in sterile water 20 mL IV syringe  2,000 mg Intravenous On Call to [address] [place] [address], MD        sodium chloride flush 0.9 % injection 10 mL  10 mL Intravenous 2 times per day Mele Chau MD        sodium chloride flush 0.9 % injection 10 mL  10 mL Intravenous PRN Mele Chau MD           Allergies:  No Known Allergies    Problem List:    Patient Active Problem List   Diagnosis Code    Finger laceration S61.219A    De Quervain's disease (tenosynovitis) M65.4    Wrist pain, right M25.531    Hand pain, right M79.641    Submental abscess L02.01    Tenosynovitis M65.9    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    Carpal tunnel syndrome on both sides, chronic G56.03       Past Medical History:        Diagnosis Date    Carpal tunnel syndrome on both sides, chronic     DeQuervain's disease (tenosynovitis) 2011    LEFT    Diarrhea     Finger laceration 2011 Past Surgical History:        Procedure Laterality Date    CARPAL TUNNEL RELEASE      CYST REMOVAL  2007    Spermcidal    FINGER FRACTURE SURGERY  1990    HEMORRHOID SURGERY  02/20/2015    ULNAR TUNNEL RELEASE      VASECTOMY  2007       Social History:    Social History     Tobacco Use    Smoking status: Light Tobacco Smoker     Packs/day: 0.25     Years: 12.00     Pack years: 3.00     Types: Cigarettes, Cigars    Smokeless tobacco: Never Used   Substance Use Topics    Alcohol use: Yes     Alcohol/week: 2.0 standard drinks     Types: 2 Cans of beer per week     Comment: social                                Ready to quit: Not Answered  Counseling given: Not Answered      Vital Signs (Current):   Vitals:    08/05/21 1012 08/06/21 0646   BP:  127/71   Pulse:  52   Resp:  16   Temp:  97.2 °F (36.2 °C)   TempSrc:  Infrared   SpO2:  98%   Weight: 176 lb (79.8 kg) 176 lb (79.8 kg)   Height: 6' 2\" (1.88 m) 6' 2\" (1.88 m)                                              BP Readings from Last 3 Encounters:   08/06/21 127/71   07/29/21 125/77   04/23/21 138/87       NPO Status: Time of last liquid consumption: 0100                        Time of last solid consumption: 2145                        Date of last liquid consumption: 08/06/21                        Date of last solid food consumption: 08/05/21    BMI:   Wt Readings from Last 3 Encounters:   08/06/21 176 lb (79.8 kg)   07/29/21 176 lb (79.8 kg)   04/23/21 176 lb (79.8 kg)     Body mass index is 22.6 kg/m².     CBC:   Lab Results   Component Value Date    WBC 5.0 11/18/2020    RBC 4.64 11/18/2020    HGB 13.4 11/18/2020    HCT 41.5 11/18/2020    MCV 89.4 11/18/2020    RDW 14.5 11/18/2020     11/18/2020       CMP:   Lab Results   Component Value Date     11/18/2020    K 5.5 11/18/2020     11/18/2020    CO2 28 11/18/2020    BUN 13 11/18/2020    CREATININE 1.2 11/18/2020    GFRAA >60 11/18/2020    LABGLOM >60 11/18/2020    GLUCOSE 98

## 2021-08-06 NOTE — H&P
General Surgery History and Physical     Patient's Name/Date of Birth: Mandi Weber / 1975     Date: 8/6/2021      Surgeon: Masha Zelaya M.D.     PCP: Cathi Barajas MD     Chief Complaint: hemorrhoids     HPI:   Mandi Weber is a 40 y.o. male who presents for evaluation of hemorrhoid that are bleeding. They have been present for years, is  having daily bms, does  take stool softener and does not fiber. The patient has  had medicines to treat the hemorrhoids.  The hemorrhoids cause some pain,  And significant bleeding.        Past Medical History        Past Medical History:   Diagnosis Date    Carpal tunnel syndrome on both sides, chronic      DeQuervain's disease (tenosynovitis) 3/21/2011    Diarrhea      Finger laceration 3/21/2011            Past Surgical History         Past Surgical History:   Procedure Laterality Date    CARPAL TUNNEL RELEASE        CYST REMOVAL   2007     Spermcidal    FINGER FRACTURE SURGERY   1990    HEMORRHOID SURGERY   02/20/2015    ULNAR TUNNEL RELEASE        VASECTOMY   2007            Current Facility-Administered Medications          Current Outpatient Medications   Medication Sig Dispense Refill    meloxicam (MOBIC) 15 MG tablet Take 1 tablet by mouth daily 30 tablet 5    traZODone (DESYREL) 100 MG tablet Take 1 tablet by mouth nightly 90 tablet 1      No current facility-administered medications for this visit.            No Known Allergies     The patient has a family history that is negative for severe cardiovascular or respiratory issues, negative for reaction to anesthesia.     Social History               Socioeconomic History    Marital status: Single       Spouse name: Not on file    Number of children: Not on file    Years of education: Not on file    Highest education level: Not on file   Occupational History    Not on file   Tobacco Use    Smoking status: Light Tobacco Smoker       Packs/day: 0.25       Years: 12.00       Pack years: 3.00     Types: Cigarettes, Cigars    Smokeless tobacco: Never Used   Substance and Sexual Activity    Alcohol use: Yes       Alcohol/week: 2.0 standard drinks       Types: 2 Cans of beer per week       Comment: social    Drug use: Yes       Types: Marijuana       Comment: used to smoke marijuana/ said he only did monday to stop the nausea.  Sexual activity: Not on file   Other Topics Concern    Not on file   Social History Narrative     ** Merged History Encounter **            Social Determinants of Health          Financial Resource Strain:     Difficulty of Paying Living Expenses:    Food Insecurity:     Worried About Running Out of Food in the Last Year:     Ran Out of Food in the Last Year:    Transportation Needs:     Lack of Transportation (Medical):      Lack of Transportation (Non-Medical):    Physical Activity:     Days of Exercise per Week:     Minutes of Exercise per Session:    Stress:     Feeling of Stress :    Social Connections:     Frequency of Communication with Friends and Family:     Frequency of Social Gatherings with Friends and Family:     Attends Temple Services:     Active Member of Clubs or Organizations:     Attends Club or Organization Meetings:     Marital Status:    Intimate Partner Violence:     Fear of Current or Ex-Partner:     Emotionally Abused:     Physically Abused:     Sexually Abused:                   Review of Systems  Review of Systems -  General ROS: negative for - chills, fatigue or malaise  ENT ROS: negative for - hearing change, nasal congestion or nasal discharge  Allergy and Immunology ROS: negative for - hives, itchy/watery eyes or nasal congestion  Hematological and Lymphatic ROS: negative for - blood clots, blood transfusions, bruising or fatigue  Endocrine ROS: negative for - malaise/lethargy, mood swings, palpitations or polydipsia/polyuria  Breast ROS: negative for - new or changing breast lumps or nipple changes  Respiratory ROS: negative for - sputum changes, stridor, tachypnea or wheezing  Cardiovascular ROS: negative for - irregular heartbeat, loss of consciousness, murmur or orthopnea  Gastrointestinal ROS: negative for - constipation, diarrhea, gas/bloating, heartburn or hematemesis, positive for hemorroids with intermittent bleeding and pain  Genito-Urinary ROS: negative for -  genital discharge, genital ulcers or hematuria  Musculoskeletal ROS: negative for - gait disturbance, muscle pain or muscular weakness     Physical exam:   /71   Pulse 52   Temp 97.2 °F (36.2 °C) (Infrared)   Resp 16   Ht 6' 2\" (1.88 m)   Wt 176 lb (79.8 kg)   SpO2 98%   BMI 22.60 kg/m²      General appearance:  NAD  Pyscho/social: negative for tremors and hallucinations  Head: NCAT, PERRLA, EOMI, red conjunctiva  Neck: supple, no masses  Lungs: CTAB, equal chest rise bilateral  Heart: Reg rate  Abdomen: soft, nontender, nondistended  Rectal: internal hemorrhoids, non inflamed, non thrombosed. Skin; no lesions  Gu: no cva tenderness  Extremities: extremities normal, atraumatic, no cyanosis or edema        Assessment:  40 y.o. male with hemorrhoids     Plan:  High fiber diet, stool softener, prn laxative for daily bm and hemorrhoidectomy.   Yadira Jorge MD

## 2021-08-06 NOTE — OP NOTE
Operative Note      Patient: Trena Redd  YOB: 1975  MRN: [id number]    Date of Procedure: 8/6/2021    Pre-Op Diagnosis: HEMORRHOIDS    Post-Op Diagnosis: Same       Procedure(s): HEMORRHOIDECTOMY    Surgeon(s):  Flavia Robbins MD    Assistant:   First Assistant: Jackelin Sky  Resident: Loyd Shepard MD    Anesthesia: General    Estimated Blood Loss (mL): Minimal    Complications: None    Specimens:   ID Type Source Tests Collected by Time Destination   A : HEMORRHOIDS Tissue Tissue SURGICAL PATHOLOGY Flavia Robbins MD 8/6/2021 0830        Implants:  * No implants in log *      Drains: * No LDAs found *    Findings: internal hemorrhoids    Detailed Description of Procedure: The patient was brought to the operative suite, general anesthesia was administered and he was positioned prone. He was then prepped and draped in normal sterile condition. Local anesthetic was infiltrated for obturator blocks bilaterally. Once this was done, the Harmonic device was able to divide and remove all three hemorrhoidal plexuses that were engorged and inflamed. Hemostasis was obtained. Once this was done, dibucaine ointment and sterile bandage was placed and the patient was extubated and transferred to the post operative area in stable condition.     Dr. Kaylee Del Rio was present and scrubbed throughout the entire case.         Electronically signed by Eva Conroy MD on 8/6/2021 at 8:54 AM

## 2021-08-11 RX ORDER — IBUPROFEN 800 MG/1
800 TABLET ORAL EVERY 6 HOURS PRN
Qty: 20 TABLET | Refills: 0 | Status: SHIPPED
Start: 2021-08-11 | End: 2021-08-17 | Stop reason: SDUPTHER

## 2021-08-13 DIAGNOSIS — G89.18 POST-OP PAIN: ICD-10-CM

## 2021-08-13 RX ORDER — OXYCODONE HYDROCHLORIDE AND ACETAMINOPHEN 5; 325 MG/1; MG/1
1 TABLET ORAL EVERY 6 HOURS PRN
Qty: 28 TABLET | Refills: 0 | Status: SHIPPED | OUTPATIENT
Start: 2021-08-13 | End: 2021-08-20

## 2021-08-17 RX ORDER — IBUPROFEN 800 MG/1
800 TABLET ORAL EVERY 6 HOURS PRN
Qty: 20 TABLET | Refills: 0 | Status: SHIPPED
Start: 2021-08-17 | End: 2022-04-11

## 2021-08-19 ENCOUNTER — OFFICE VISIT (OUTPATIENT)
Dept: SURGERY | Age: 46
End: 2021-08-19

## 2021-08-19 VITALS
WEIGHT: 176 LBS | SYSTOLIC BLOOD PRESSURE: 143 MMHG | HEART RATE: 67 BPM | OXYGEN SATURATION: 99 % | DIASTOLIC BLOOD PRESSURE: 91 MMHG | HEIGHT: 74 IN | BODY MASS INDEX: 22.59 KG/M2 | TEMPERATURE: 98 F

## 2021-08-19 DIAGNOSIS — K64.2 GRADE III HEMORRHOIDS: ICD-10-CM

## 2021-08-19 DIAGNOSIS — Z09 POSTOPERATIVE EXAMINATION: ICD-10-CM

## 2021-08-19 DIAGNOSIS — G89.18 POST-OP PAIN: Primary | ICD-10-CM

## 2021-08-19 PROCEDURE — 99024 POSTOP FOLLOW-UP VISIT: CPT | Performed by: SURGERY

## 2021-08-19 RX ORDER — OXYCODONE HYDROCHLORIDE AND ACETAMINOPHEN 5; 325 MG/1; MG/1
1 TABLET ORAL EVERY 4 HOURS PRN
Qty: 28 TABLET | Refills: 0 | Status: SHIPPED | OUTPATIENT
Start: 2021-08-19 | End: 2021-08-26

## 2021-08-19 RX ORDER — IBUPROFEN 800 MG/1
800 TABLET ORAL EVERY 6 HOURS PRN
Qty: 20 TABLET | Refills: 0 | Status: SHIPPED
Start: 2021-08-19 | End: 2021-09-09

## 2021-08-19 NOTE — PROGRESS NOTES
Surgery Progress Note            Chief complaint:   Patient Active Problem List   Diagnosis    Finger laceration    De Quervain's disease (tenosynovitis)    Wrist pain, right    Hand pain, right    Submental abscess    Tenosynovitis    Ulnar nerve entrapment    Carpal tunnel syndrome on both sides, chronic    Grade III hemorrhoids       S: doing well    O:   Vitals:    08/19/21 1128   BP: (!) 143/91   Pulse: 67   Temp: 98 °F (36.7 °C)   SpO2: 99%     No intake or output data in the 24 hours ending 08/19/21 1139        Labs:  No results for input(s): WBC, HGB, HCT in the last 72 hours. Invalid input(s): PLR  Lab Results   Component Value Date    CREATININE 1.2 11/18/2020    BUN 13 11/18/2020     11/18/2020    K 5.5 (H) 11/18/2020     11/18/2020    CO2 28 11/18/2020     No results for input(s): LIPASE, AMYLASE in the last 72 hours. Physical exam:   BP (!) 143/91 (Site: Left Upper Arm, Position: Sitting, Cuff Size: Medium Adult)   Pulse 67   Temp 98 °F (36.7 °C)   Ht 6' 2\" (1.88 m)   Wt 176 lb (79.8 kg)   SpO2 99%   BMI 22.60 kg/m²   General appearance: NAD  Head: NCAT  Neck: supple, no masses  Lungs: equal chest rise bilateral  Heart: S1S2 present  Abdomen: soft, nontender, nondistended  Skin; no new lesions, incisions clean and intact  Gu: no cva tenderness  Extremities: extremities normal, atraumatic, no cyanosis or edema    A:  Post op hemorrhoidectomy    P: follow up as needed.      Jennifer Snider MD, MD  8/19/2021

## 2021-09-02 ENCOUNTER — OFFICE VISIT (OUTPATIENT)
Dept: SURGERY | Age: 46
End: 2021-09-02

## 2021-09-02 VITALS
SYSTOLIC BLOOD PRESSURE: 115 MMHG | HEIGHT: 74 IN | TEMPERATURE: 97.7 F | BODY MASS INDEX: 22.59 KG/M2 | WEIGHT: 176 LBS | DIASTOLIC BLOOD PRESSURE: 74 MMHG | HEART RATE: 78 BPM

## 2021-09-02 DIAGNOSIS — G89.18 POST-OP PAIN: Primary | ICD-10-CM

## 2021-09-02 DIAGNOSIS — K64.5 EXTERNAL HEMORRHOID, THROMBOSED: ICD-10-CM

## 2021-09-02 PROCEDURE — 99024 POSTOP FOLLOW-UP VISIT: CPT | Performed by: SURGERY

## 2021-09-02 RX ORDER — OXYCODONE HYDROCHLORIDE AND ACETAMINOPHEN 5; 325 MG/1; MG/1
1 TABLET ORAL EVERY 6 HOURS PRN
Qty: 28 TABLET | Refills: 0 | Status: SHIPPED | OUTPATIENT
Start: 2021-09-02 | End: 2021-09-09

## 2021-09-02 NOTE — PROGRESS NOTES
General Surgery History and Physical    Patient's Name/Date of Birth: Rafi Cherry / 1975    Date: September 2, 2021     Surgeon: Ino Raya M.D.    PCP: Maninder Merritt MD     Chief Complaint: residual hemorrhoid    HPI:   Rafi Cherry is a 40 y.o. male who presents for evaluation of residual hemorrhoid after hemorrhoidectomy and is causing severe pain. Past Medical History:   Diagnosis Date    Carpal tunnel syndrome on both sides, chronic     DeQuervain's disease (tenosynovitis) 03/21/2011    LEFT    Diarrhea     Finger laceration 03/21/2011       Past Surgical History:   Procedure Laterality Date    CARPAL TUNNEL RELEASE      CYST REMOVAL  2007    Spermcidal    FINGER FRACTURE SURGERY  1990    HEMORRHOID SURGERY  02/20/2015    HEMORRHOID SURGERY N/A 8/6/2021    HEMORRHOIDECTOMY performed by Vladimir Espitia MD at [address] [place] [address]  2007       Current Outpatient Medications   Medication Sig Dispense Refill    oxyCODONE-acetaminophen (PERCOCET) 5-325 MG per tablet Take 1 tablet by mouth every 6 hours as needed for Pain for up to 7 days. Intended supply: 7 days. Take lowest dose possible to manage pain 28 tablet 0    ibuprofen (ADVIL;MOTRIN) 800 MG tablet Take 1 tablet by mouth every 6 hours as needed for Pain 20 tablet 0    ibuprofen (ADVIL;MOTRIN) 800 MG tablet Take 1 tablet by mouth every 6 hours as needed for Pain 20 tablet 0    meloxicam (MOBIC) 15 MG tablet Take 1 tablet by mouth daily 30 tablet 5    traZODone (DESYREL) 100 MG tablet Take 1 tablet by mouth nightly (Patient taking differently: Take 100 mg by mouth nightly as needed for Sleep ) 90 tablet 1     No current facility-administered medications for this visit. No Known Allergies    The patient has a family history that is negative for severe cardiovascular or respiratory issues, negative for reaction to anesthesia.     Social History     Socioeconomic History    Marital status: Single     Spouse name: Not on file    Number of children: Not on file    Years of education: Not on file    Highest education level: Not on file   Occupational History    Not on file   Tobacco Use    Smoking status: Light Tobacco Smoker     Packs/day: 0.25     Years: 12.00     Pack years: 3.00     Types: Cigarettes, Cigars    Smokeless tobacco: Never Used   Vaping Use    Vaping Use: Never used   Substance and Sexual Activity    Alcohol use: Yes     Alcohol/week: 2.0 standard drinks     Types: 2 Cans of beer per week     Comment: social    Drug use: Yes     Types: Marijuana     Comment: used to smoke marijuana/ said he only did monday to stop the nausea.  Sexual activity: Not on file   Other Topics Concern    Not on file   Social History Narrative    ** Merged History Encounter **          Social Determinants of Health     Financial Resource Strain:     Difficulty of Paying Living Expenses:    Food Insecurity:     Worried About Running Out of Food in the Last Year:     Ran Out of Food in the Last Year:    Transportation Needs:     Lack of Transportation (Medical):      Lack of Transportation (Non-Medical):    Physical Activity:     Days of Exercise per Week:     Minutes of Exercise per Session:    Stress:     Feeling of Stress :    Social Connections:     Frequency of Communication with Friends and Family:     Frequency of Social Gatherings with Friends and Family:     Attends Anabaptism Services:     Active Member of Clubs or Organizations:     Attends Club or Organization Meetings:     Marital Status:    Intimate Partner Violence:     Fear of Current or Ex-Partner:     Emotionally Abused:     Physically Abused:     Sexually Abused:            Review of Systems  Review of Systems -  General ROS: negative for - chills, fatigue or malaise  ENT ROS: negative for - hearing change, nasal congestion or nasal discharge  Allergy and Immunology ROS: negative for - hives, itchy/watery eyes or nasal congestion  Hematological and Lymphatic ROS: negative for - blood clots, blood transfusions, bruising or fatigue  Endocrine ROS: negative for - malaise/lethargy, mood swings, palpitations or polydipsia/polyuria  Respiratory ROS: negative for - sputum changes, stridor, tachypnea or wheezing  Cardiovascular ROS: negative for - irregular heartbeat, loss of consciousness, murmur or orthopnea  Gastrointestinal ROS: negative for - constipation, diarrhea, gas/bloating, heartburn or hematemesis  Genito-Urinary ROS: negative for -  genital discharge, genital ulcers or hematuria  Musculoskeletal ROS: negative for - gait disturbance, muscle pain or muscular weakness    Physical exam:   /74 (Site: Right Upper Arm, Position: Sitting, Cuff Size: Medium Adult)   Pulse 78   Temp 97.7 °F (36.5 °C)   Ht 6' 2\" (1.88 m)   Wt 176 lb (79.8 kg)   BMI 22.60 kg/m²   General appearance:  NAD  Head: NCAT, PERRLA, EOMI, red conjunctiva  Neck: supple, no masses  Lungs: CTAB, equal chest rise bilateral  Heart: Reg rate  Abdomen: soft, nontender, nondistended  Rectal: residual swollen external hemorrhoid  Skin; no lesions  Gu: no cva tenderness  Extremities: extremities normal, atraumatic, no cyanosis or edema      Assessment:  40 y.o. male with residual external hemorrhoid with clot and inflammation    Plan: Will do EUA and hemorrhoidectomy  Discussed the risk, benefits and alternatives of surgery including wound infections, bleeding, scar and hernia formation and the risks of general anesthetic including MI, CVA, sudden death or reactions to anesthetic medications. The patient understands the risks and alternatives and the possibility of converting to an open procedure. All questions were answered to the patient's satisfaction and they freely signed the consent.       Severino Diaz MD  10:37 AM  9/2/2021

## 2021-09-07 ENCOUNTER — TELEPHONE (OUTPATIENT)
Dept: SURGERY | Age: 46
End: 2021-09-07

## 2021-09-07 NOTE — TELEPHONE ENCOUNTER
Prior Authorization Form:      DEMOGRAPHICS:                     Patient Name:  Ez Cardenas  Patient :  1975            Insurance:  Payor: Mai Richmond / Plan: Mai [place] - [place] PPO / Product Type: *No Product type* /   Insurance ID Number:    Payor/Plan Subscr  Sex Relation Sub. Ins. ID Effective Group Num   1. Canelo GANT 1975 Male Self [id number] 4/15/18 [id number]                                    [id number]         DIAGNOSIS & PROCEDURE:                       Procedure/Operation: EUA hemorrhoidectomy           CPT Code: 13532    Diagnosis:   Thrombosed external hemorrhoid    ICD10 Code: K64.5    Location:  [address] [place] [address]    Surgeon:  Arnold Beauchamp INFORMATION:                          Date: 9/10/21    Time: TBD              Anesthesia:  General                                                       Status:  Outpatient        Special Comments:         Electronically signed by Chantel Arellano RN on 2021 at 11:59 AM

## 2021-09-07 NOTE — TELEPHONE ENCOUNTER
Per Dr. Zhanna Burns, patient is scheduled for EUA hemorrhoidectomy at [facility] on 9/10/21. Surgery scheduling form faxed with confirmation, surgeon's calendar updated. Dr. Zhanna Burns to enter orders. Follow up appointment scheduled.    Electronically signed by Justin Dove RN on 9/7/2021 at 11:59 AM

## 2021-09-08 ENCOUNTER — PREP FOR PROCEDURE (OUTPATIENT)
Dept: SURGERY | Age: 46
End: 2021-09-08

## 2021-09-08 RX ORDER — SODIUM CHLORIDE 9 MG/ML
25 INJECTION, SOLUTION INTRAVENOUS PRN
Status: CANCELLED | OUTPATIENT
Start: 2021-09-08

## 2021-09-08 RX ORDER — SODIUM CHLORIDE 0.9 % (FLUSH) 0.9 %
10 SYRINGE (ML) INJECTION EVERY 12 HOURS SCHEDULED
Status: CANCELLED | OUTPATIENT
Start: 2021-09-08

## 2021-09-08 RX ORDER — SODIUM CHLORIDE 0.9 % (FLUSH) 0.9 %
10 SYRINGE (ML) INJECTION PRN
Status: CANCELLED | OUTPATIENT
Start: 2021-09-08

## 2021-09-09 NOTE — PROGRESS NOTES
[facility]                                                                                                                    PRE OP INSTRUCTIONS FOR  Mumtaz Wheatley        Date: 9/9/2021    Date of surgery:  9/10/2021    Arrival Time: Hospital will call you between 5pm and 7pm with your final arrival time for surgery    1. Do not eat or drink anything after  Midnight  prior to surgery. This includes no water, chewing gum, mints or ice chips. 2. Take the following medications with a small sip of water on the morning of Surgery: pain med    3. Diabetics may take evening dose of insulin but none after midnight. If you feel symptomatic or low blood sugar morning of surgery drink 1-2 ounces of apple juice only. 4. Aspirin, Ibuprofen, Advil, Naproxen, Vitamin E and other Anti-inflammatory products should be stopped  before surgery  as directed by your physician. Take Tylenol only unless instructed otherwise by your surgeon. 5. Check with your Doctor regarding stopping Plavix, Coumadin, Lovenox, Eliquis, Effient, or other blood thinners. 6. Do not smoke,use illicit drugs and do not drink any alcoholic beverages 24 hours prior to surgery. 7. You may brush your teeth the morning of surgery. DO NOT SWALLOW WATER    8. You MUST make arrangements for a responsible adult to take you home after your surgery. You will not be allowed to leave alone or drive yourself home. It is strongly suggested someone stay with you the first 24 hrs. Your surgery will be cancelled if you do not have a ride home. 9. PEDIATRIC PATIENTS ONLY:  A parent/legal guardian must accompany a child scheduled for surgery and plan to stay at the hospital until the child is discharged. Please do not bring other children with you.     10. Please wear simple, loose fitting clothing to the hospital.  Guillaume Marquisider not bring valuables (money, credit cards, checkbooks, etc.) Do not wear any makeup (including no eye makeup) or nail polish on your fingers or toes. 11. DO NOT wear any jewelry or piercings on day of surgery. All body piercing jewelry must be removed. 12. Shower the night before surgery with _x__Antibacterial soap /FAYE WIPES________    13. TOTAL JOINT REPLACEMENT/HYSTERECTOMY PATIENTS ONLY---Remember to bring Blood Bank bracelet to the hospital on the day of surgery. 14. If you have a Living Will and Durable Power of  for Healthcare, please bring in a copy. 15. If appropriate bring crutches, inspirex, WALKER, CANE etc... 12. Notify your Surgeon if you develop any illness between now and surgery time, cough, cold, fever, sore throat, nausea, vomiting, etc.  Please notify your surgeon if you experience dizziness, shortness of breath or blurred vision between now & the time of your surgery. 17. If you have ___dentures, they will be removed before going to the OR; we will provide you a container. If you wear ___contact lenses or ___glasses, they will be removed; please bring a case for them. 18. To provide excellent care visitors will be limited to 2 in the room at any given time. 19. Please bring picture ID and insurance card. 20. Sleep apnea patients need to bring CPAP AND SETTINGS to hospital on day of surgery. 21. During flu season no children under the age of 15 are permitted in the hospital for the safety of all patients. 22. Other                  Please call AMBULATORY CARE if you have any further questions.    [facility]     [address]

## 2021-09-10 ENCOUNTER — ANESTHESIA EVENT (OUTPATIENT)
Dept: OPERATING ROOM | Age: 46
End: 2021-09-10
Payer: COMMERCIAL

## 2021-09-10 ENCOUNTER — ANESTHESIA (OUTPATIENT)
Dept: OPERATING ROOM | Age: 46
End: 2021-09-10
Payer: COMMERCIAL

## 2021-09-10 ENCOUNTER — HOSPITAL ENCOUNTER (OUTPATIENT)
Age: 46
Setting detail: OUTPATIENT SURGERY
Discharge: HOME | End: 2021-09-10
Attending: SURGERY | Admitting: SURGERY
Payer: COMMERCIAL

## 2021-09-10 VITALS
OXYGEN SATURATION: 100 % | DIASTOLIC BLOOD PRESSURE: 94 MMHG | RESPIRATION RATE: 24 BRPM | SYSTOLIC BLOOD PRESSURE: 130 MMHG

## 2021-09-10 VITALS
WEIGHT: 176 LBS | SYSTOLIC BLOOD PRESSURE: 121 MMHG | RESPIRATION RATE: 16 BRPM | HEART RATE: 50 BPM | DIASTOLIC BLOOD PRESSURE: 76 MMHG | HEIGHT: 74 IN | OXYGEN SATURATION: 97 % | TEMPERATURE: 97.1 F | BODY MASS INDEX: 22.59 KG/M2

## 2021-09-10 DIAGNOSIS — K64.2 GRADE III HEMORRHOIDS: Primary | ICD-10-CM

## 2021-09-10 PROCEDURE — 7100000000 HC PACU RECOVERY - FIRST 15 MIN: Performed by: SURGERY

## 2021-09-10 PROCEDURE — 7100000011 HC PHASE II RECOVERY - ADDTL 15 MIN: Performed by: SURGERY

## 2021-09-10 PROCEDURE — 99024 POSTOP FOLLOW-UP VISIT: CPT | Performed by: SURGERY

## 2021-09-10 PROCEDURE — 7100000001 HC PACU RECOVERY - ADDTL 15 MIN: Performed by: SURGERY

## 2021-09-10 PROCEDURE — 2500000003 HC RX 250 WO HCPCS: Performed by: NURSE ANESTHETIST, CERTIFIED REGISTERED

## 2021-09-10 PROCEDURE — 3700000001 HC ADD 15 MINUTES (ANESTHESIA): Performed by: SURGERY

## 2021-09-10 PROCEDURE — 46258 REMOVE IN/EX HEM GRP W/FISTU: CPT | Performed by: SURGERY

## 2021-09-10 PROCEDURE — 88304 TISSUE EXAM BY PATHOLOGIST: CPT

## 2021-09-10 PROCEDURE — 6370000000 HC RX 637 (ALT 250 FOR IP): Performed by: ANESTHESIOLOGY

## 2021-09-10 PROCEDURE — 6360000002 HC RX W HCPCS

## 2021-09-10 PROCEDURE — 3700000000 HC ANESTHESIA ATTENDED CARE: Performed by: SURGERY

## 2021-09-10 PROCEDURE — 3600000002 HC SURGERY LEVEL 2 BASE: Performed by: SURGERY

## 2021-09-10 PROCEDURE — 3600000012 HC SURGERY LEVEL 2 ADDTL 15MIN: Performed by: SURGERY

## 2021-09-10 PROCEDURE — 2720000010 HC SURG SUPPLY STERILE: Performed by: SURGERY

## 2021-09-10 PROCEDURE — 6360000002 HC RX W HCPCS: Performed by: NURSE ANESTHETIST, CERTIFIED REGISTERED

## 2021-09-10 PROCEDURE — 2500000003 HC RX 250 WO HCPCS: Performed by: SURGERY

## 2021-09-10 PROCEDURE — 6370000000 HC RX 637 (ALT 250 FOR IP): Performed by: SURGERY

## 2021-09-10 PROCEDURE — 7100000010 HC PHASE II RECOVERY - FIRST 15 MIN: Performed by: SURGERY

## 2021-09-10 PROCEDURE — 2580000003 HC RX 258: Performed by: ANESTHESIOLOGY

## 2021-09-10 PROCEDURE — 2580000003 HC RX 258: Performed by: NURSE ANESTHETIST, CERTIFIED REGISTERED

## 2021-09-10 PROCEDURE — 2709999900 HC NON-CHARGEABLE SUPPLY: Performed by: SURGERY

## 2021-09-10 RX ORDER — ROCURONIUM BROMIDE 10 MG/ML
INJECTION, SOLUTION INTRAVENOUS PRN
Status: DISCONTINUED | OUTPATIENT
Start: 2021-09-10 | End: 2021-09-10 | Stop reason: SDUPTHER

## 2021-09-10 RX ORDER — MEPERIDINE HYDROCHLORIDE 25 MG/ML
12.5 INJECTION INTRAMUSCULAR; INTRAVENOUS; SUBCUTANEOUS EVERY 5 MIN PRN
Status: DISCONTINUED | OUTPATIENT
Start: 2021-09-10 | End: 2021-09-10 | Stop reason: HOSPADM

## 2021-09-10 RX ORDER — ONDANSETRON 2 MG/ML
4 INJECTION INTRAMUSCULAR; INTRAVENOUS
Status: DISCONTINUED | OUTPATIENT
Start: 2021-09-10 | End: 2021-09-10 | Stop reason: HOSPADM

## 2021-09-10 RX ORDER — LIDOCAINE HYDROCHLORIDE 20 MG/ML
INJECTION, SOLUTION INTRAVENOUS PRN
Status: DISCONTINUED | OUTPATIENT
Start: 2021-09-10 | End: 2021-09-10 | Stop reason: SDUPTHER

## 2021-09-10 RX ORDER — IBUPROFEN 800 MG/1
800 TABLET ORAL EVERY 6 HOURS PRN
Qty: 20 TABLET | Refills: 0 | Status: SHIPPED | OUTPATIENT
Start: 2021-09-10 | End: 2022-04-11

## 2021-09-10 RX ORDER — DIBUCAINE 0.28 G/28G
OINTMENT TOPICAL PRN
Status: DISCONTINUED | OUTPATIENT
Start: 2021-09-10 | End: 2021-09-10 | Stop reason: ALTCHOICE

## 2021-09-10 RX ORDER — MIDAZOLAM HYDROCHLORIDE 1 MG/ML
INJECTION INTRAMUSCULAR; INTRAVENOUS PRN
Status: DISCONTINUED | OUTPATIENT
Start: 2021-09-10 | End: 2021-09-10 | Stop reason: SDUPTHER

## 2021-09-10 RX ORDER — SODIUM CHLORIDE 0.9 % (FLUSH) 0.9 %
10 SYRINGE (ML) INJECTION EVERY 12 HOURS SCHEDULED
Status: DISCONTINUED | OUTPATIENT
Start: 2021-09-10 | End: 2021-09-10 | Stop reason: HOSPADM

## 2021-09-10 RX ORDER — PROPOFOL 10 MG/ML
INJECTION, EMULSION INTRAVENOUS PRN
Status: DISCONTINUED | OUTPATIENT
Start: 2021-09-10 | End: 2021-09-10 | Stop reason: SDUPTHER

## 2021-09-10 RX ORDER — SODIUM CHLORIDE 9 MG/ML
25 INJECTION, SOLUTION INTRAVENOUS PRN
Status: DISCONTINUED | OUTPATIENT
Start: 2021-09-10 | End: 2021-09-10 | Stop reason: HOSPADM

## 2021-09-10 RX ORDER — BUPIVACAINE HYDROCHLORIDE AND EPINEPHRINE 2.5; 5 MG/ML; UG/ML
INJECTION, SOLUTION EPIDURAL; INFILTRATION; INTRACAUDAL; PERINEURAL PRN
Status: DISCONTINUED | OUTPATIENT
Start: 2021-09-10 | End: 2021-09-10 | Stop reason: ALTCHOICE

## 2021-09-10 RX ORDER — SODIUM CHLORIDE, SODIUM LACTATE, POTASSIUM CHLORIDE, CALCIUM CHLORIDE 600; 310; 30; 20 MG/100ML; MG/100ML; MG/100ML; MG/100ML
INJECTION, SOLUTION INTRAVENOUS CONTINUOUS PRN
Status: DISCONTINUED | OUTPATIENT
Start: 2021-09-10 | End: 2021-09-10 | Stop reason: SDUPTHER

## 2021-09-10 RX ORDER — OXYCODONE HYDROCHLORIDE AND ACETAMINOPHEN 5; 325 MG/1; MG/1
1 TABLET ORAL EVERY 6 HOURS PRN
Qty: 28 TABLET | Refills: 0 | Status: SHIPPED | OUTPATIENT
Start: 2021-09-10 | End: 2021-09-17

## 2021-09-10 RX ORDER — ONDANSETRON 2 MG/ML
INJECTION INTRAMUSCULAR; INTRAVENOUS PRN
Status: DISCONTINUED | OUTPATIENT
Start: 2021-09-10 | End: 2021-09-10 | Stop reason: SDUPTHER

## 2021-09-10 RX ORDER — SODIUM CHLORIDE, SODIUM LACTATE, POTASSIUM CHLORIDE, CALCIUM CHLORIDE 600; 310; 30; 20 MG/100ML; MG/100ML; MG/100ML; MG/100ML
INJECTION, SOLUTION INTRAVENOUS CONTINUOUS
Status: DISCONTINUED | OUTPATIENT
Start: 2021-09-10 | End: 2021-09-10 | Stop reason: HOSPADM

## 2021-09-10 RX ORDER — OXYCODONE HYDROCHLORIDE AND ACETAMINOPHEN 5; 325 MG/1; MG/1
1 TABLET ORAL ONCE
Status: COMPLETED | OUTPATIENT
Start: 2021-09-10 | End: 2021-09-10

## 2021-09-10 RX ORDER — FENTANYL CITRATE 50 UG/ML
50 INJECTION, SOLUTION INTRAMUSCULAR; INTRAVENOUS EVERY 5 MIN PRN
Status: DISCONTINUED | OUTPATIENT
Start: 2021-09-10 | End: 2021-09-10 | Stop reason: HOSPADM

## 2021-09-10 RX ORDER — SODIUM CHLORIDE 0.9 % (FLUSH) 0.9 %
10 SYRINGE (ML) INJECTION PRN
Status: DISCONTINUED | OUTPATIENT
Start: 2021-09-10 | End: 2021-09-10 | Stop reason: HOSPADM

## 2021-09-10 RX ORDER — FENTANYL CITRATE 50 UG/ML
25 INJECTION, SOLUTION INTRAMUSCULAR; INTRAVENOUS EVERY 5 MIN PRN
Status: DISCONTINUED | OUTPATIENT
Start: 2021-09-10 | End: 2021-09-10 | Stop reason: HOSPADM

## 2021-09-10 RX ORDER — DEXAMETHASONE SODIUM PHOSPHATE 10 MG/ML
INJECTION, SOLUTION INTRAMUSCULAR; INTRAVENOUS PRN
Status: DISCONTINUED | OUTPATIENT
Start: 2021-09-10 | End: 2021-09-10 | Stop reason: SDUPTHER

## 2021-09-10 RX ORDER — FENTANYL CITRATE 50 UG/ML
INJECTION, SOLUTION INTRAMUSCULAR; INTRAVENOUS PRN
Status: DISCONTINUED | OUTPATIENT
Start: 2021-09-10 | End: 2021-09-10 | Stop reason: SDUPTHER

## 2021-09-10 RX ADMIN — FENTANYL CITRATE 100 MCG: 50 INJECTION, SOLUTION INTRAMUSCULAR; INTRAVENOUS at 07:48

## 2021-09-10 RX ADMIN — ONDANSETRON 4 MG: 2 INJECTION INTRAMUSCULAR; INTRAVENOUS at 07:42

## 2021-09-10 RX ADMIN — LIDOCAINE HYDROCHLORIDE 80 MG: 20 INJECTION, SOLUTION INTRAVENOUS at 07:32

## 2021-09-10 RX ADMIN — DEXAMETHASONE SODIUM PHOSPHATE 10 MG: 10 INJECTION, SOLUTION INTRAMUSCULAR; INTRAVENOUS at 07:42

## 2021-09-10 RX ADMIN — SODIUM CHLORIDE, POTASSIUM CHLORIDE, SODIUM LACTATE AND CALCIUM CHLORIDE: 600; 310; 30; 20 INJECTION, SOLUTION INTRAVENOUS at 06:44

## 2021-09-10 RX ADMIN — ROCURONIUM BROMIDE 20 MG: 10 SOLUTION INTRAVENOUS at 07:32

## 2021-09-10 RX ADMIN — OXYCODONE AND ACETAMINOPHEN 1 TABLET: 5; 325 TABLET ORAL at 09:31

## 2021-09-10 RX ADMIN — MIDAZOLAM 2 MG: 1 INJECTION INTRAMUSCULAR; INTRAVENOUS at 07:27

## 2021-09-10 RX ADMIN — SODIUM CHLORIDE, POTASSIUM CHLORIDE, SODIUM LACTATE AND CALCIUM CHLORIDE: 600; 310; 30; 20 INJECTION, SOLUTION INTRAVENOUS at 07:27

## 2021-09-10 RX ADMIN — PROPOFOL 200 MG: 10 INJECTION, EMULSION INTRAVENOUS at 07:32

## 2021-09-10 ASSESSMENT — PULMONARY FUNCTION TESTS
PIF_VALUE: 21
PIF_VALUE: 22
PIF_VALUE: 20
PIF_VALUE: 14
PIF_VALUE: 20
PIF_VALUE: 9
PIF_VALUE: 2
PIF_VALUE: 20
PIF_VALUE: 10
PIF_VALUE: 18
PIF_VALUE: 25
PIF_VALUE: 17
PIF_VALUE: 0
PIF_VALUE: 44
PIF_VALUE: 18
PIF_VALUE: 18
PIF_VALUE: 31
PIF_VALUE: 12
PIF_VALUE: 16
PIF_VALUE: 11
PIF_VALUE: 18

## 2021-09-10 ASSESSMENT — PAIN DESCRIPTION - ONSET: ONSET: ON-GOING

## 2021-09-10 ASSESSMENT — PAIN DESCRIPTION - LOCATION: LOCATION: RECTUM

## 2021-09-10 ASSESSMENT — PAIN DESCRIPTION - DESCRIPTORS
DESCRIPTORS: ACHING;SHARP
DESCRIPTORS: ACHING

## 2021-09-10 ASSESSMENT — PAIN DESCRIPTION - PAIN TYPE: TYPE: SURGICAL PAIN;ACUTE PAIN

## 2021-09-10 ASSESSMENT — PAIN SCALES - GENERAL: PAINLEVEL_OUTOF10: 9

## 2021-09-10 ASSESSMENT — PAIN - FUNCTIONAL ASSESSMENT: PAIN_FUNCTIONAL_ASSESSMENT: 0-10

## 2021-09-10 ASSESSMENT — LIFESTYLE VARIABLES: SMOKING_STATUS: 1

## 2021-09-10 ASSESSMENT — PAIN DESCRIPTION - FREQUENCY: FREQUENCY: CONTINUOUS

## 2021-09-10 NOTE — ANESTHESIA PRE PROCEDURE
Department of Anesthesiology  Preprocedure Note       Name:  Olinda Olivia   Age:  40 y.o.  :  1975                                          MRN:  [id number]         Date:  9/10/2021      Surgeon: Jaime Evans):  Kirby Crocker MD    Procedure: Procedure(s):  EXAM UNDER ANESTHESIA, HEMORRHOIDECTOMY (CPT 70157)    Medications prior to admission:   Prior to Admission medications    Medication Sig Start Date End Date Taking?  Authorizing Provider   FIBER PO Take by mouth daily Plus stool softener   Yes Historical Provider, MD   ibuprofen (ADVIL;MOTRIN) 800 MG tablet Take 1 tablet by mouth every 6 hours as needed for Pain 21  Yes Kirby Crocker MD   traZODone (DESYREL) 100 MG tablet Take 1 tablet by mouth nightly  Patient taking differently: Take 100 mg by mouth nightly as needed for Sleep  7/10/19  Yes Mary Jane Olivares MD   meloxicam [facility]) 15 MG tablet Take 1 tablet by mouth daily 21   Mary Jane Olivares MD       Current medications:    Current Facility-Administered Medications   Medication Dose Route Frequency Provider Last Rate Last Admin    lactated ringers infusion   IntraVENous Continuous Gael Cheney  mL/hr at 09/10/21 0644 New Bag at 09/10/21 0644    0.9 % sodium chloride infusion  25 mL IntraVENous PRN Kirby Crocker MD        ceFAZolin (ANCEF) 2000 mg in sterile water 20 mL IV syringe  2,000 mg IntraVENous On Call to [address] [place] [address] Sherine, MD        sodium chloride flush 0.9 % injection 10 mL  10 mL IntraVENous 2 times per day Kirby Crocker MD        sodium chloride flush 0.9 % injection 10 mL  10 mL IntraVENous PRN Kirby Crocker MD           Allergies:  No Known Allergies    Problem List:    Patient Active Problem List   Diagnosis Code    Finger laceration S61.219A    De Quervain's disease (tenosynovitis) M65.4    Wrist pain, right M25.531    Hand pain, right M79.641    Submental abscess L02.01    Tenosynovitis M65.9    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    Carpal tunnel syndrome on both sides, chronic G56.03    Grade III hemorrhoids K64.2       Past Medical History:        Diagnosis Date    Back pain     due to foot problems hallux valgus  plantar fasciitis    Carpal tunnel syndrome on both sides, chronic     DeQuervain's disease (tenosynovitis) 03/21/2011    LEFT    Finger laceration 03/21/2011       Past Surgical History:        Procedure Laterality Date    CARPAL TUNNEL RELEASE      CYST REMOVAL  2007    Spermcidal    FINGER FRACTURE SURGERY  1990    HEMORRHOID SURGERY  02/20/2015    HEMORRHOID SURGERY N/A 8/6/2021    HEMORRHOIDECTOMY performed by Alonzo Jones MD at [address] VASECTOMY  2007       Social History:    Social History     Tobacco Use    Smoking status: Light Tobacco Smoker     Packs/day: 0.25     Years: 12.00     Pack years: 3.00     Types: Cigarettes, Cigars    Smokeless tobacco: Never Used   Substance Use Topics    Alcohol use: Yes     Alcohol/week: 2.0 standard drinks     Types: 2 Cans of beer per week     Comment: social                                Ready to quit: Not Answered  Counseling given: Not Answered      Vital Signs (Current):   Vitals:    09/09/21 1234 09/10/21 0619   BP:  123/81   Pulse:  55   Resp:  16   Temp:  97.3 °F (36.3 °C)   SpO2:  99%   Weight: 176 lb (79.8 kg)    Height: 6' 2\" (1.88 m)                                               BP Readings from Last 3 Encounters:   09/10/21 123/81   09/02/21 115/74   08/19/21 (!) 143/91       NPO Status: Time of last liquid consumption: 2345                        Time of last solid consumption: 2345                        Date of last liquid consumption: 09/09/21                        Date of last solid food consumption: 09/09/21    BMI:   Wt Readings from Last 3 Encounters:   09/09/21 176 lb (79.8 kg)   09/02/21 176 lb (79.8 kg)   08/19/21 176 lb (79.8 kg)     Body mass index is 22.6 kg/m².     CBC:   Lab Results   Component Value Date    WBC 5.0 11/18/2020 RBC 4.64 11/18/2020    HGB 13.4 11/18/2020    HCT 41.5 11/18/2020    MCV 89.4 11/18/2020    RDW 14.5 11/18/2020     11/18/2020       CMP:   Lab Results   Component Value Date     11/18/2020    K 5.5 11/18/2020     11/18/2020    CO2 28 11/18/2020    BUN 13 11/18/2020    CREATININE 1.2 11/18/2020    GFRAA >60 11/18/2020    LABGLOM >60 11/18/2020    GLUCOSE 98 11/18/2020    PROT 6.5 11/18/2020    CALCIUM 9.3 11/18/2020    BILITOT 0.4 11/18/2020    ALKPHOS 38 11/18/2020    AST 17 11/18/2020    ALT 10 11/18/2020       POC Tests: No results for input(s): POCGLU, POCNA, POCK, POCCL, POCBUN, POCHEMO, POCHCT in the last 72 hours. Coags: No results found for: PROTIME, INR, APTT    HCG (If Applicable): No results found for: PREGTESTUR, PREGSERUM, HCG, HCGQUANT     ABGs: No results found for: PHART, PO2ART, KLT1VXP, WLU7MGT, BEART, Z3UYVOHB     Type & Screen (If Applicable):  No results found for: LABABO, LABRH    Drug/Infectious Status (If Applicable):  No results found for: HIV, HEPCAB    COVID-19 Screening (If Applicable): No results found for: COVID19        Anesthesia Evaluation  Patient summary reviewed  Airway: Mallampati: II  TM distance: >3 FB   Neck ROM: full  Mouth opening: > = 3 FB Dental: normal exam         Pulmonary: breath sounds clear to auscultation  (+) current smoker                           Cardiovascular:Negative CV ROS            Rhythm: regular  Rate: normal                    Neuro/Psych:   (+) neuromuscular disease:,             GI/Hepatic/Renal: Neg GI/Hepatic/Renal ROS            Endo/Other: Negative Endo/Other ROS                    Abdominal:       Abdomen: soft. Vascular: Other Findings:             Anesthesia Plan      general     ASA 2       Induction: intravenous. Anesthetic plan and risks discussed with patient. Plan discussed with CRNA.                   Betty Bourgeois MD   9/10/2021

## 2021-09-10 NOTE — ANESTHESIA POSTPROCEDURE EVALUATION
Department of Anesthesiology  Postprocedure Note    Patient: Kimberley Leonardo  MRN: [id number]  YOB: 1975  Date of evaluation: 9/10/2021  Time:  8:43 AM     Procedure Summary     Date: 09/10/21 Room / Location: [address] [place] [address] [facility]    Anesthesia Start: 4306 Anesthesia Stop: 0877    Procedure: EXAM UNDER ANESTHESIA, HEMORRHOIDECTOMY (CPT 98042) (N/A Buttocks) Diagnosis: (EXTERNAL HEMMORHOID, THROMBOSED)    Surgeons: Keren Chen MD Responsible Provider: Richard Barrios MD    Anesthesia Type: general ASA Status: 2          Anesthesia Type: general    Melita Phase I: Melita Score: 10    Melita Phase II:      Last vitals: Reviewed and per EMR flowsheets.        Anesthesia Post Evaluation    Patient location during evaluation: PACU  Patient participation: complete - patient participated  Level of consciousness: awake  Pain score: 1  Airway patency: patent  Nausea & Vomiting: no nausea  Complications: no  Cardiovascular status: blood pressure returned to baseline  Respiratory status: acceptable  Hydration status: euvolemic

## 2021-09-10 NOTE — PROGRESS NOTES
Dr. Gonzalez Berrios notified of the patient's vital signs and bradycardia. Okay to discharge to [facility]. Also verbal consent given to send to [facility] prior to one hour completion time in PACU.

## 2021-09-10 NOTE — H&P
General Surgery History and Physical     Patient's Name/Date of Birth: Rafi Cherry / 1975     Date: 9/10/2021       Surgeon: Ino Raya M.D.     PCP: Maninder Merritt MD     Chief Complaint: residual hemorrhoid     HPI:   Rafi Cherry is a 40 y.o. male who presents for evaluation of residual hemorrhoid after hemorrhoidectomy and is causing severe pain.         Past Medical History        Past Medical History:   Diagnosis Date    Carpal tunnel syndrome on both sides, chronic      DeQuervain's disease (tenosynovitis) 03/21/2011     LEFT    Diarrhea      Finger laceration 03/21/2011            Past Surgical History         Past Surgical History:   Procedure Laterality Date    CARPAL TUNNEL RELEASE        CYST REMOVAL   2007     Spermcidal    FINGER FRACTURE SURGERY   1990    HEMORRHOID SURGERY   02/20/2015    HEMORRHOID SURGERY N/A 8/6/2021     HEMORRHOIDECTOMY performed by Vladimir Espitia MD at [address] [place] [address]   2007            Current Facility-Administered Medications          Current Outpatient Medications   Medication Sig Dispense Refill    oxyCODONE-acetaminophen (PERCOCET) 5-325 MG per tablet Take 1 tablet by mouth every 6 hours as needed for Pain for up to 7 days. Intended supply: 7 days.  Take lowest dose possible to manage pain 28 tablet 0    ibuprofen (ADVIL;MOTRIN) 800 MG tablet Take 1 tablet by mouth every 6 hours as needed for Pain 20 tablet 0    ibuprofen (ADVIL;MOTRIN) 800 MG tablet Take 1 tablet by mouth every 6 hours as needed for Pain 20 tablet 0    meloxicam (MOBIC) 15 MG tablet Take 1 tablet by mouth daily 30 tablet 5    traZODone (DESYREL) 100 MG tablet Take 1 tablet by mouth nightly (Patient taking differently: Take 100 mg by mouth nightly as needed for Sleep ) 90 tablet 1      No current facility-administered medications for this visit.            No Known Allergies     The patient has a family history that is negative for severe cardiovascular or respiratory issues, negative for reaction to anesthesia.     Social History               Socioeconomic History    Marital status: Single       Spouse name: Not on file    Number of children: Not on file    Years of education: Not on file    Highest education level: Not on file   Occupational History    Not on file   Tobacco Use    Smoking status: Light Tobacco Smoker       Packs/day: 0.25       Years: 12.00       Pack years: 3.00       Types: Cigarettes, Cigars    Smokeless tobacco: Never Used   Vaping Use    Vaping Use: Never used   Substance and Sexual Activity    Alcohol use: Yes       Alcohol/week: 2.0 standard drinks       Types: 2 Cans of beer per week       Comment: social    Drug use: Yes       Types: Marijuana       Comment: used to smoke marijuana/ said he only did monday to stop the nausea.  Sexual activity: Not on file   Other Topics Concern    Not on file   Social History Narrative     ** Merged History Encounter **            Social Determinants of Health          Financial Resource Strain:     Difficulty of Paying Living Expenses:    Food Insecurity:     Worried About Running Out of Food in the Last Year:     Ran Out of Food in the Last Year:    Transportation Needs:     Lack of Transportation (Medical):      Lack of Transportation (Non-Medical):    Physical Activity:     Days of Exercise per Week:     Minutes of Exercise per Session:    Stress:     Feeling of Stress :    Social Connections:     Frequency of Communication with Friends and Family:     Frequency of Social Gatherings with Friends and Family:     Attends Muslim Services:     Active Member of Clubs or Organizations:     Attends Club or Organization Meetings:     Marital Status:    Intimate Partner Violence:     Fear of Current or Ex-Partner:     Emotionally Abused:     Physically Abused:     Sexually Abused:                   Review of Systems  Review of Systems -  General ROS: negative for - chills, fatigue or malaise  ENT ROS: negative for - hearing change, nasal congestion or nasal discharge  Allergy and Immunology ROS: negative for - hives, itchy/watery eyes or nasal congestion  Hematological and Lymphatic ROS: negative for - blood clots, blood transfusions, bruising or fatigue  Endocrine ROS: negative for - malaise/lethargy, mood swings, palpitations or polydipsia/polyuria  Respiratory ROS: negative for - sputum changes, stridor, tachypnea or wheezing  Cardiovascular ROS: negative for - irregular heartbeat, loss of consciousness, murmur or orthopnea  Gastrointestinal ROS: negative for - constipation, diarrhea, gas/bloating, heartburn or hematemesis  Genito-Urinary ROS: negative for -  genital discharge, genital ulcers or hematuria  Musculoskeletal ROS: negative for - gait disturbance, muscle pain or muscular weakness     Physical exam:   /81   Pulse 55   Temp 97.3 °F (36.3 °C)   Resp 16   Ht 6' 2\" (1.88 m)   Wt 176 lb (79.8 kg)   SpO2 99%   BMI 22.60 kg/m²      General appearance:  NAD  Head: NCAT, PERRLA, EOMI, red conjunctiva  Neck: supple, no masses  Lungs: CTAB, equal chest rise bilateral  Heart: Reg rate  Abdomen: soft, nontender, nondistended  Rectal: residual swollen external hemorrhoid  Skin; no lesions  Gu: no cva tenderness  Extremities: extremities normal, atraumatic, no cyanosis or edema        Assessment:  40 y.o. male with residual external hemorrhoid with clot and inflammation     Plan: Will do EUA and hemorrhoidectomy  Discussed the risk, benefits and alternatives of surgery including wound infections, bleeding, scar and hernia formation and the risks of general anesthetic including MI, CVA, sudden death or reactions to anesthetic medications. The patient understands the risks and alternatives and the possibility of converting to an open procedure.  All questions were answered to the patient's satisfaction and they freely signed the consent.        Ann Harris

## 2021-09-17 ENCOUNTER — TELEPHONE (OUTPATIENT)
Dept: SURGERY | Age: 46
End: 2021-09-17

## 2021-09-17 DIAGNOSIS — G89.18 POST-OP PAIN: Primary | ICD-10-CM

## 2021-09-17 RX ORDER — OXYCODONE HYDROCHLORIDE AND ACETAMINOPHEN 5; 325 MG/1; MG/1
1 TABLET ORAL EVERY 6 HOURS PRN
Qty: 20 TABLET | Refills: 0 | Status: CANCELLED | OUTPATIENT
Start: 2021-09-17 | End: 2021-09-22

## 2021-09-17 RX ORDER — IBUPROFEN 800 MG/1
800 TABLET ORAL 4 TIMES DAILY PRN
Qty: 120 TABLET | Refills: 0 | Status: CANCELLED | OUTPATIENT
Start: 2021-09-17

## 2021-09-17 RX ORDER — OXYCODONE HYDROCHLORIDE AND ACETAMINOPHEN 5; 325 MG/1; MG/1
1 TABLET ORAL EVERY 6 HOURS PRN
Qty: 28 TABLET | Refills: 0 | Status: SHIPPED | OUTPATIENT
Start: 2021-09-17 | End: 2021-09-24

## 2021-09-17 RX ORDER — IBUPROFEN 800 MG/1
800 TABLET ORAL EVERY 6 HOURS PRN
Qty: 20 TABLET | Refills: 0 | Status: SHIPPED | OUTPATIENT
Start: 2021-09-17 | End: 2022-04-11

## 2021-09-17 NOTE — TELEPHONE ENCOUNTER
Patient had hemorrhoidectomy done on 9/10/21 by Dr. Nancy Alfaro. Phone call received from patient c/o 9/10 post-operative pain, requesting pain medication refill. Patient admits to following all post-op instructions and recommendations. Requesting refill on percocet and ibuprofen 800. Pharmacy on file confirmed with patient. Medical refill sent to pharmacy by Dr. Nancy Alfaro.   Electronically signed by Nikki Winters RN on 9/17/2021 at 2:47 PM

## 2021-09-23 ENCOUNTER — OFFICE VISIT (OUTPATIENT)
Dept: SURGERY | Age: 46
End: 2021-09-23

## 2021-09-23 VITALS
HEIGHT: 74 IN | HEART RATE: 61 BPM | BODY MASS INDEX: 22.59 KG/M2 | RESPIRATION RATE: 18 BRPM | OXYGEN SATURATION: 95 % | DIASTOLIC BLOOD PRESSURE: 78 MMHG | WEIGHT: 176 LBS | SYSTOLIC BLOOD PRESSURE: 121 MMHG | TEMPERATURE: 98.1 F

## 2021-09-23 DIAGNOSIS — K64.2 GRADE III HEMORRHOIDS: Primary | ICD-10-CM

## 2021-09-23 PROCEDURE — 99024 POSTOP FOLLOW-UP VISIT: CPT | Performed by: SURGERY

## 2021-09-23 NOTE — PROGRESS NOTES
Surgery Progress Note            Chief complaint:   Patient Active Problem List   Diagnosis    Finger laceration    De Quervain's disease (tenosynovitis)    Wrist pain, right    Hand pain, right    Submental abscess    Tenosynovitis    Ulnar nerve entrapment    Carpal tunnel syndrome on both sides, chronic    Grade III hemorrhoids       S: doing well    O:   Vitals:    09/23/21 1043   BP: 121/78   Pulse: 61   Resp: 18   Temp: 98.1 °F (36.7 °C)   SpO2: 95%     No intake or output data in the 24 hours ending 09/23/21 1052        Labs:  No results for input(s): WBC, HGB, HCT in the last 72 hours. Invalid input(s): PLR  Lab Results   Component Value Date    CREATININE 1.2 11/18/2020    BUN 13 11/18/2020     11/18/2020    K 5.5 (H) 11/18/2020     11/18/2020    CO2 28 11/18/2020     No results for input(s): LIPASE, AMYLASE in the last 72 hours. Physical exam:   /78 (Site: Right Upper Arm, Position: Sitting, Cuff Size: Medium Adult)   Pulse 61   Temp 98.1 °F (36.7 °C) (Temporal)   Resp 18   Ht 6' 2\" (1.88 m)   Wt 176 lb (79.8 kg)   SpO2 95%   BMI 22.60 kg/m²   General appearance: NAD  Head: NCAT  Neck: supple, no masses  Lungs: equal chest rise bilateral  Heart: S1S2 present  Abdomen: soft, nontender, nondistended  Skin; no new lesions, incisions clean and intact  Gu: no cva tenderness  Extremities: extremities normal, atraumatic, no cyanosis or edema    A:  Post op hemorrhoidectomy    P: follow up as needed.      Flavia Robbins MD, MD  9/23/2021

## 2021-09-23 NOTE — LETTER
[facility]  [address] [address]  Phone: [phone]  Fax: [phone]    Autumn Darnell MD        September 23, 2021     Patient: Ashlee Becker   YOB: 1975   Date of Visit: 9/23/2021       To Whom it May Concern:    Jesus No was seen in my clinic on 9/23/2021. He is recovering appropriately but still has some additional time to be fully recovered. If you have any questions or concerns, please don't hesitate to call.     Sincerely,         Autumn Darnell MD

## 2021-09-29 ENCOUNTER — TELEPHONE (OUTPATIENT)
Dept: SURGERY | Age: 46
End: 2021-09-29

## 2021-09-29 DIAGNOSIS — G89.18 POST-OP PAIN: Primary | ICD-10-CM

## 2021-09-29 RX ORDER — OXYCODONE HYDROCHLORIDE AND ACETAMINOPHEN 5; 325 MG/1; MG/1
1 TABLET ORAL EVERY 6 HOURS PRN
Qty: 28 TABLET | Refills: 0 | Status: SHIPPED | OUTPATIENT
Start: 2021-09-29 | End: 2021-10-06

## 2021-09-29 RX ORDER — IBUPROFEN 800 MG/1
800 TABLET ORAL EVERY 6 HOURS PRN
Qty: 20 TABLET | Refills: 0 | Status: SHIPPED | OUTPATIENT
Start: 2021-09-29 | End: 2022-04-11

## 2021-09-29 NOTE — TELEPHONE ENCOUNTER
Patient called requesting a refill of Percocet. Dr. Harris Franklin agreed but said this is the last time he will fill it. Patient verbalized understanding.     Electronically signed by Adrianna Rick MA on 9/29/2021 at 9:55 AM

## 2021-11-04 ENCOUNTER — OFFICE VISIT (OUTPATIENT)
Dept: SURGERY | Age: 46
End: 2021-11-04

## 2021-11-04 VITALS
DIASTOLIC BLOOD PRESSURE: 68 MMHG | WEIGHT: 176 LBS | HEART RATE: 80 BPM | BODY MASS INDEX: 22.59 KG/M2 | SYSTOLIC BLOOD PRESSURE: 110 MMHG | RESPIRATION RATE: 18 BRPM | OXYGEN SATURATION: 98 % | HEIGHT: 74 IN | TEMPERATURE: 97.8 F

## 2021-11-04 DIAGNOSIS — K64.2 GRADE III HEMORRHOIDS: Primary | ICD-10-CM

## 2021-11-04 PROCEDURE — 99024 POSTOP FOLLOW-UP VISIT: CPT | Performed by: SURGERY

## 2021-11-04 RX ORDER — IBUPROFEN 800 MG/1
800 TABLET ORAL EVERY 6 HOURS PRN
Qty: 20 TABLET | Refills: 0 | Status: SHIPPED
Start: 2021-11-04 | End: 2022-04-11

## 2021-11-04 NOTE — LETTER
[facility]  [address] [place] [address]  Phone: [phone]  Fax: [phone]    Leticia No MD        November 4, 2021     Patient: Michelle Pierre   YOB: 1975   Date of Visit: 11/4/2021       To Whom it May Concern:    Jenniffer Rios was seen in my clinic on 11/4/2021. He may return to work on 11/15/21 without restrictions. If you have any questions or concerns, please don't hesitate to call.     Sincerely,         Leticia No MD

## 2021-12-16 ENCOUNTER — OFFICE VISIT (OUTPATIENT)
Dept: PRIMARY CARE CLINIC | Age: 46
End: 2021-12-16
Payer: COMMERCIAL

## 2021-12-16 VITALS
HEIGHT: 74 IN | BODY MASS INDEX: 22.59 KG/M2 | TEMPERATURE: 98.3 F | SYSTOLIC BLOOD PRESSURE: 122 MMHG | HEART RATE: 89 BPM | WEIGHT: 176 LBS | DIASTOLIC BLOOD PRESSURE: 76 MMHG | OXYGEN SATURATION: 98 %

## 2021-12-16 DIAGNOSIS — R25.2 CRAMPING OF HANDS: Primary | ICD-10-CM

## 2021-12-16 PROCEDURE — G8420 CALC BMI NORM PARAMETERS: HCPCS | Performed by: NURSE PRACTITIONER

## 2021-12-16 PROCEDURE — 4004F PT TOBACCO SCREEN RCVD TLK: CPT | Performed by: NURSE PRACTITIONER

## 2021-12-16 PROCEDURE — 99213 OFFICE O/P EST LOW 20 MIN: CPT | Performed by: NURSE PRACTITIONER

## 2021-12-16 PROCEDURE — G8427 DOCREV CUR MEDS BY ELIG CLIN: HCPCS | Performed by: NURSE PRACTITIONER

## 2021-12-16 PROCEDURE — G8484 FLU IMMUNIZE NO ADMIN: HCPCS | Performed by: NURSE PRACTITIONER

## 2021-12-16 RX ORDER — METHYLPREDNISOLONE 4 MG/1
TABLET ORAL
Qty: 1 KIT | Refills: 0 | Status: SHIPPED
Start: 2021-12-16 | End: 2022-04-11

## 2021-12-16 RX ORDER — CYCLOBENZAPRINE HCL 10 MG
10 TABLET ORAL NIGHTLY PRN
Qty: 7 TABLET | Refills: 0 | Status: SHIPPED | OUTPATIENT
Start: 2021-12-16 | End: 2021-12-23

## 2021-12-16 NOTE — PROGRESS NOTES
Chief Complaint:   Hand Problem (Rt hand pain/cramping since monday)    History of Present Illness   Source of history provided by:  patient. Michelle Muñoz is a 40 y.o. old male who presents to the [facility] care complaining of Right hand cramping for the past 4 days. Denies any injury or trauma. 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 Denies any bruising, swelling, paresthesias, weakness in the extremity, abrasions, active bleeding, recent illness, or any other complaints today. He reports when he gets cramping, a couple fingers will \"lock up\" and needs to hold his fingers straight until the muscles relax. He is taking Meloxicam for the symptoms without complete relief. Reports hx of carpal tunnel release right side and right ulnar nerve release in 2010. This year had right 5th metacarpal fracture in March, did not need surgery at that time. He is right hand dominant. Review of Systems   Unless otherwise stated in this report or unable to obtain because of the patient's clinical or mental status as evidenced by the medical record, this patients's positive and negative responses for Review of Systems, constitutional, psych, eyes, ENT, cardiovascular, respiratory, gastrointestinal, neurological, genitourinary, musculoskeletal, integument systems and systems related to the presenting problem are either stated in the preceding or were not pertinent or were negative for the symptoms and/or complaints related to the medical problem. Past Medical History:  has a past medical history of Back pain, Carpal tunnel syndrome on both sides, chronic, DeQuervain's disease (tenosynovitis), and Finger laceration. Past Surgical History:  has a past surgical history that includes Vasectomy (2007); Finger fracture surgery (1990); Carpal tunnel release; Ulnar tunnel release; Hemorrhoid surgery (02/20/2015); cyst removal (2007);  Hemorrhoid surgery (N/A, 8/6/2021); and Hemorrhoid surgery (N/A, 9/10/2021). Social History:  reports that he has been smoking cigarettes and cigars. He has a 3.00 pack-year smoking history. He has never used smokeless tobacco. He reports current alcohol use of about 2.0 standard drinks of alcohol per week. He reports current drug use. Drug: Marijuana Moshe Jock). Family History: family history is not on file. Allergies: Patient has no known allergies. Physical Exam   Vital Signs:  /76   Pulse 89   Temp 98.3 °F (36.8 °C)   Ht 6' 2\" (1.88 m)   Wt 176 lb (79.8 kg)   SpO2 98%   BMI 22.60 kg/m²    Oxygen Saturation Interpretation: Normal.    Constitutional:  A&Ox3, development consistent with age, NAD. CV: Heart RRR, no murmurs, rubs, or gallops. Lungs: CTAB without W/R/R. Wrist: Right              Tenderness:  None            Swelling: None             Deformity: No obvious deformity noted. ROM: Full ROM             Skin:  No erythema, rashes, or abrasions noted. Neurovascular: Motor deficit: /UE strength 5/5 bilaterally. No increased pain with supination or pronation of the hand. Sensory deficit:   Sensation intact proximally and distally to the injury site. Pulse deficit: 2+ and bounding. Capillary refill: Less then 2 sec throughout. Hand: Right              Tenderness: None              Swelling: None. Deformity: No obvious deformity. No malrotation noted. ROM: Full ROM. Skin:  No abrasions, erythema, or rashes noted. Neurological:  Alert and oriented. Motor functions intact. Test Results Section   Imaging: All Radiology results interpreted by Radiologist unless otherwise noted. No results found. Assessment / Plan   Impression(s):  Scottie Jung was seen today for hand problem. Diagnoses and all orders for this visit:    Cramping of hands  -     cyclobenzaprine (FLEXERIL) 10 MG tablet;  Take 1 tablet by mouth nightly as needed for Muscle spasms  -     methylPREDNISolone (MEDROL DOSEPACK) 4 MG tablet; Take by mouth. Scripts written for Flexeril and Medrol dosepack , side effects discussed. RICE protocol advised. Pt advised to f/u with orthopedics ASAP for further management and care. ER sooner if symptoms persist, worsen, or change. Red flag symptoms discussed. All questions answered. Return if symptoms worsen or fail to improve. Electronically signed by JEANCARLOS Veras CNP   DD: 12/16/21    **This report was transcribed using voice recognition software. Every effort was made to ensure accuracy; however, inadvertent computerized transcription errors may be present.

## 2022-01-06 LAB
MEASLES IMMUNE (IGG): NORMAL
MUMPS AB IGG: NORMAL
RUBELLA ANTIBODY IGG: NORMAL
VARICELLA-ZOSTER VIRUS AB, IGG: NORMAL

## 2022-03-04 ENCOUNTER — OFFICE VISIT (OUTPATIENT)
Dept: PRIMARY CARE CLINIC | Age: 47
End: 2022-03-04
Payer: COMMERCIAL

## 2022-03-04 VITALS
HEART RATE: 74 BPM | DIASTOLIC BLOOD PRESSURE: 76 MMHG | TEMPERATURE: 98.5 F | WEIGHT: 176 LBS | BODY MASS INDEX: 22.59 KG/M2 | SYSTOLIC BLOOD PRESSURE: 118 MMHG | OXYGEN SATURATION: 98 % | HEIGHT: 74 IN

## 2022-03-04 DIAGNOSIS — G89.29 CHRONIC BILATERAL LOW BACK PAIN WITHOUT SCIATICA: ICD-10-CM

## 2022-03-04 DIAGNOSIS — M54.50 CHRONIC BILATERAL LOW BACK PAIN WITHOUT SCIATICA: ICD-10-CM

## 2022-03-04 DIAGNOSIS — T78.40XA ALLERGIC REACTION, INITIAL ENCOUNTER: Primary | ICD-10-CM

## 2022-03-04 DIAGNOSIS — F51.01 PRIMARY INSOMNIA: ICD-10-CM

## 2022-03-04 PROCEDURE — G8420 CALC BMI NORM PARAMETERS: HCPCS | Performed by: NURSE PRACTITIONER

## 2022-03-04 PROCEDURE — 99213 OFFICE O/P EST LOW 20 MIN: CPT | Performed by: NURSE PRACTITIONER

## 2022-03-04 PROCEDURE — G8427 DOCREV CUR MEDS BY ELIG CLIN: HCPCS | Performed by: NURSE PRACTITIONER

## 2022-03-04 PROCEDURE — 4004F PT TOBACCO SCREEN RCVD TLK: CPT | Performed by: NURSE PRACTITIONER

## 2022-03-04 PROCEDURE — G8484 FLU IMMUNIZE NO ADMIN: HCPCS | Performed by: NURSE PRACTITIONER

## 2022-03-04 PROCEDURE — 96372 THER/PROPH/DIAG INJ SC/IM: CPT | Performed by: NURSE PRACTITIONER

## 2022-03-04 RX ORDER — TRIAMCINOLONE ACETONIDE 40 MG/ML
40 INJECTION, SUSPENSION INTRA-ARTICULAR; INTRAMUSCULAR ONCE
Status: COMPLETED | OUTPATIENT
Start: 2022-03-04 | End: 2022-03-04

## 2022-03-04 RX ORDER — TRAZODONE HYDROCHLORIDE 100 MG/1
100 TABLET ORAL NIGHTLY
Qty: 90 TABLET | Refills: 1 | Status: SHIPPED | OUTPATIENT
Start: 2022-03-04

## 2022-03-04 RX ORDER — MELOXICAM 15 MG/1
15 TABLET ORAL DAILY
Qty: 30 TABLET | Refills: 5 | Status: SHIPPED | OUTPATIENT
Start: 2022-03-04

## 2022-03-04 RX ORDER — TRIAMCINOLONE ACETONIDE 40 MG/ML
40 INJECTION, SUSPENSION INTRA-ARTICULAR; INTRAMUSCULAR ONCE
Status: DISCONTINUED | OUTPATIENT
Start: 2022-03-04 | End: 2022-03-04

## 2022-03-04 RX ADMIN — TRIAMCINOLONE ACETONIDE 40 MG: 40 INJECTION, SUSPENSION INTRA-ARTICULAR; INTRAMUSCULAR at 12:05

## 2022-03-04 NOTE — PATIENT INSTRUCTIONS
Patient Education        Allergic Reaction: Care Instructions  Your Care Instructions     An allergic reaction is an excessive response from your immune system to a medicine, chemical, food, insect bite, or other substance. A reaction can range from mild to life-threatening. Some people have a mild rash, hives, and itching or stomach cramps. In severe reactions, swelling of your tongue and throat can close up your airway so that you cannot breathe. Follow-up care is a key part of your treatment and safety. Be sure to make and go to all appointments, and call your doctor if you are having problems. It's also a good idea to know your test results and keep a list of the medicines you take. How can you care for yourself at home? · If you know what caused your allergic reaction, be sure to avoid it. Your allergy may become more severe each time you have a reaction. · Take an over-the-counter antihistamine, such as cetirizine (Zyrtec) or loratadine (Claritin), to treat mild symptoms. Read and follow directions on the label. Some antihistamines can make you feel sleepy. Do not give antihistamines to a child unless you have checked with your doctor first. Mild symptoms include sneezing or an itchy or runny nose; an itchy mouth; a few hives or mild itching; and mild nausea or stomach discomfort. · Do not scratch hives or a rash. Put a cold, moist towel on them or take cool baths to relieve itching. Put ice packs on hives, swelling, or insect stings for 10 to 15 minutes at a time. Put a thin cloth between the ice pack and your skin. Do not take hot baths or showers. They will make the itching worse. · Your doctor may prescribe a shot of epinephrine to carry with you in case you have a severe reaction. Learn how to give yourself the shot and keep it with you at all times. Make sure it is not .   · Go to the emergency room every time you have a severe reaction, even if you have used your shot of epinephrine and are feeling better. Symptoms can come back after a shot. · Wear medical alert jewelry that lists your allergies. You can buy this at most drugstores. · If your child has a severe allergy, make sure that his or her teachers, babysitters, coaches, and other caregivers know about the allergy. They should have an epinephrine shot, know how and when to give it, and have a plan to take your child to the hospital.  When should you call for help? Give an epinephrine shot if:    · You think you are having a severe allergic reaction.     · You have symptoms in more than one body area, such as mild nausea and an itchy mouth. After giving an epinephrine shot call 911, even if you feel better. Call 911 if:    · You have symptoms of a severe allergic reaction. These may include:  ? Sudden raised, red areas (hives) all over your body. ? Swelling of the throat, mouth, lips, or tongue. ? Trouble breathing. ? Passing out (losing consciousness). Or you may feel very lightheaded or suddenly feel weak, confused, or restless.     · You have been given an epinephrine shot, even if you feel better. Call your doctor now or seek immediate medical care if:    · You have symptoms of an allergic reaction, such as:  ? A rash or hives (raised, red areas on the skin). ? Itching. ? Swelling. ? Belly pain, nausea, or vomiting. Watch closely for changes in your health, and be sure to contact your doctor if:    · You do not get better as expected. Where can you learn more? Go to [email or web address] and sign in to your [employer] account. Enter N221 in the [employer] box to learn more about \"Allergic Reaction: Care Instructions. \"     If you do not have an account, please click on the \"Sign Up Now\" link. Current as of: February 10, 2021               Content Version: 13.1  © 7141-5757 Healthwise, Incorporated. Care instructions adapted under license by [facility] ([facility]).  If you have questions about a medical condition or this instruction, always ask your healthcare professional. [place] [address] any warranty or liability for your use of this information.        over the counter generic Claritin or zyrtec daily x 7 days

## 2022-03-04 NOTE — PROGRESS NOTES
Chief Complaint:   Allergic Reaction (torso and back)      History of Present Illness   Source of history provided by:  patient. History/Exam Limitations: none. Noé Steinberg is a 40 y.o. old male who has a past medical history of:   Past Medical History:   Diagnosis Date    Back pain     due to foot problems hallux valgus  plantar fasciitis    Carpal tunnel syndrome on both sides, chronic     DeQuervain's disease (tenosynovitis) 03/21/2011    LEFT    Finger laceration 03/21/2011    presents to the [facility] care for complaints of a suspected allergic reaction to clothes detergent, onset 24 hours ago. Since onset the symptoms have been progressing and worsening. Pt states they have an itchy rash, denies throat irritation, and  SOB. Denies any CP, tongue edema, dizziness, N/V/D, neck pain/stiffness, HA, weakness, or lethargy. No   meds given PTA. He is also requesting a refill on long term medications today. ROS    Unless otherwise stated in this report or unable to obtain because of the patient's clinical or mental status as evidenced by the medical record, this patients's positive and negative responses for Review of Systems, constitutional, psych, eyes, ENT, cardiovascular, respiratory, gastrointestinal, neurological, genitourinary, musculoskeletal, integument systems and systems related to the presenting problem are either stated in the preceding or were not pertinent or were negative for the symptoms and/or complaints related to the medical problem. Past Surgical History:  has a past surgical history that includes Vasectomy (2007); Finger fracture surgery (1990); Carpal tunnel release; Ulnar tunnel release; Hemorrhoid surgery (02/20/2015); cyst removal (2007); Hemorrhoid surgery (N/A, 8/6/2021); and Hemorrhoid surgery (N/A, 9/10/2021). Social History:  reports that he has been smoking cigarettes and cigars. He has a 3.00 pack-year smoking history.  He has never used smokeless tobacco. He reports current alcohol use of about 2.0 standard drinks of alcohol per week. He reports current drug use. Drug: Marijuana Gelene Chasten). Family History: family history is not on file. Allergies: Patient has no known allergies. Physical Exam         VS:  /76   Pulse 74   Temp 98.5 °F (36.9 °C)   Ht 6' 2\" (1.88 m)   Wt 176 lb (79.8 kg)   SpO2 98%   BMI 22.60 kg/m²    Oxygen Saturation Interpretation: Normal.    General Appearance/Constitutional:  Alert, development consistent with age, NAD. HEENT:  NC/NT. PERRL. Airway patent. Tongue without edema. No posterior pharyngeal edema or erythema. Neck:  Normal ROM. Supple. Lungs:  Clear to auscultation and breath sounds equal bilaterally without W/R/R. Heart:  Regular rate and rhythm, normal heart sounds, without pathological murmurs, ectopy, gallops, or rubs. Back:  No costovertebral tenderness. Extremities: No tenderness or edema noted. Skin:  Normal turgor. Warm and dry. urticaric rash noted to the trunk and bilateral upper extremities. Area blanches to palpation. No papules, blisters, bullae, or lymphangitic streaking noted. Neurological:  Alert and oriented. Motor functions intact. Lab / Imaging Results     (All laboratory and radiology results have been personally reviewed by myself)  Labs:    Imaging: All Radiology results interpreted by Radiologist unless otherwise noted. Assessment / Plan     Impression(s):    1. Allergic reaction, initial encounter  Generic OTC claritin or zyrtec daily x 7-10 days  - triamcinolone acetonide (KENALOG-40) injection 40 mg    2. Chronic bilateral low back pain without sciatica  Refill for patient   - meloxicam (MOBIC) 15 MG tablet; Take 1 tablet by mouth daily  Dispense: 30 tablet; Refill: 5    3. Primary insomnia  Refill for patient   - traZODone (DESYREL) 100 MG tablet; Take 1 tablet by mouth nightly  Dispense: 90 tablet;  Refill: 1        Pt advised to f/u with PCP in 1-2 days for

## 2022-04-11 ENCOUNTER — OFFICE VISIT (OUTPATIENT)
Dept: PRIMARY CARE CLINIC | Age: 47
End: 2022-04-11
Payer: COMMERCIAL

## 2022-04-11 VITALS
OXYGEN SATURATION: 97 % | TEMPERATURE: 98.1 F | HEART RATE: 85 BPM | HEIGHT: 74 IN | SYSTOLIC BLOOD PRESSURE: 120 MMHG | BODY MASS INDEX: 23.1 KG/M2 | WEIGHT: 180 LBS | DIASTOLIC BLOOD PRESSURE: 70 MMHG

## 2022-04-11 DIAGNOSIS — Z71.1 CONCERN ABOUT STD IN MALE WITHOUT DIAGNOSIS: Primary | ICD-10-CM

## 2022-04-11 DIAGNOSIS — Z71.1 CONCERN ABOUT STD IN MALE WITHOUT DIAGNOSIS: ICD-10-CM

## 2022-04-11 LAB
APPEARANCE FLUID: CLEAR
BILIRUBIN, POC: NORMAL
BLOOD URINE, POC: NORMAL
CLARITY, POC: CLEAR
COLOR, POC: YELLOW
GLUCOSE URINE, POC: NORMAL
KETONES, POC: NORMAL
LEUKOCYTE EST, POC: NORMAL
NITRITE, POC: NORMAL
PH, POC: 5.5
PROTEIN, POC: NORMAL
SPECIFIC GRAVITY, POC: 1.03
UROBILINOGEN, POC: 0.2

## 2022-04-11 PROCEDURE — 81002 URINALYSIS NONAUTO W/O SCOPE: CPT | Performed by: NURSE PRACTITIONER

## 2022-04-11 PROCEDURE — G8420 CALC BMI NORM PARAMETERS: HCPCS | Performed by: NURSE PRACTITIONER

## 2022-04-11 PROCEDURE — 4004F PT TOBACCO SCREEN RCVD TLK: CPT | Performed by: NURSE PRACTITIONER

## 2022-04-11 PROCEDURE — 99213 OFFICE O/P EST LOW 20 MIN: CPT | Performed by: NURSE PRACTITIONER

## 2022-04-11 PROCEDURE — G8427 DOCREV CUR MEDS BY ELIG CLIN: HCPCS | Performed by: NURSE PRACTITIONER

## 2022-04-11 PROCEDURE — 96372 THER/PROPH/DIAG INJ SC/IM: CPT | Performed by: NURSE PRACTITIONER

## 2022-04-11 RX ORDER — AZITHROMYCIN 500 MG/1
1000 TABLET, FILM COATED ORAL ONCE
Status: COMPLETED | OUTPATIENT
Start: 2022-04-11 | End: 2022-04-11

## 2022-04-11 RX ORDER — CEFTRIAXONE 500 MG/1
500 INJECTION, POWDER, FOR SOLUTION INTRAMUSCULAR; INTRAVENOUS ONCE
Status: COMPLETED | OUTPATIENT
Start: 2022-04-11 | End: 2022-04-11

## 2022-04-11 RX ADMIN — CEFTRIAXONE 500 MG: 500 INJECTION, POWDER, FOR SOLUTION INTRAMUSCULAR; INTRAVENOUS at 10:28

## 2022-04-11 RX ADMIN — AZITHROMYCIN 1000 MG: 500 TABLET, FILM COATED ORAL at 10:29

## 2022-04-11 NOTE — PROGRESS NOTES
Chief Complaint:   Sexually Transmitted Diseases (pinching feeling in penis, has seen Dr Camden Almaraz for this before, possibly std related, refused to schedule with PCP)    History of Present Illness   Source of history provided by:  patient. Ashlee Becker is a 40 y.o. old male who presents to the walk-in for concerns of possible exposure to STD with pinching sensation at tip of urethra. Pt denies any fever, chills, abdominal pain, back pain, pain with erection, or lethargy. Last time he had unprotected intercourse was 1 week ago. Review of Systems    Unless otherwise stated in this report or unable to obtain because of the patient's clinical or mental status as evidenced by the medical record, this patients's positive and negative responses for Review of Systems, constitutional, psych, eyes, ENT, cardiovascular, respiratory, gastrointestinal, neurological, genitourinary, musculoskeletal, integument systems and systems related to the presenting problem are either stated in the preceding or were not pertinent or were negative for the symptoms and/or complaints related to the medical problem. Past Medical History:  has a past medical history of Back pain, Carpal tunnel syndrome on both sides, chronic, DeQuervain's disease (tenosynovitis), and Finger laceration. Past Surgical History:  has a past surgical history that includes Vasectomy (2007); Finger fracture surgery (1990); Carpal tunnel release; Ulnar tunnel release; Hemorrhoid surgery (02/20/2015); cyst removal (2007); Hemorrhoid surgery (N/A, 8/6/2021); and Hemorrhoid surgery (N/A, 9/10/2021). Social History:  reports that he has been smoking cigarettes and cigars. He has a 3.00 pack-year smoking history. He has never used smokeless tobacco. He reports current alcohol use of about 2.0 standard drinks of alcohol per week. He reports current drug use. Drug: Marijuana Garon Kettle). Family History: family history is not on file.   Allergies: Patient has no known allergies. Physical Exam   Vital Signs:  /70   Pulse 85   Temp 98.1 °F (36.7 °C) (Temporal)   Ht 6' 2\" (1.88 m)   Wt 180 lb (81.6 kg)   SpO2 97%   BMI 23.11 kg/m²    Oxygen Saturation Interpretation: Normal.    Constitutional:  Alert, development consistent with age. Neck:  Normal ROM. Supple. Lungs:  Clear to auscultation and breath sounds equal.  Heart:  Regular rate and rhythm, normal heart sounds, without pathological murmurs, ectopy, gallops, or rubs. Abdomen:  Soft, nontender, good bowel sounds. No firm or pulsatile mass. Genitalia Exam:        Penis: No obvious lesions noted on shaft of penis. No discharge from urethra       Scrotum: No tenderness or lesions noted. Testicle: Descended bilaterally. No masses noted. Skin:  Normal turgor. Warm, dry, without visible rash, unless noted elsewhere. Neurological:  Orientation age-appropriate. Motor functions intact. Test Results Section   (All laboratory and radiology results have been personally reviewed by myself)  Labs:  Results for orders placed or performed in visit on 04/11/22   POCT Urinalysis no Micro   Result Value Ref Range    Color, UA yellow     Clarity, UA clear     Glucose, UA POC neg     Bilirubin, UA neg     Ketones, UA neg     Spec Grav, UA 1.030     Blood, UA POC neg     pH, UA 5.5     Protein, UA POC neg     Urobilinogen, UA 0.2     Leukocytes, UA neg     Nitrite, UA neg     Appearance, Fluid Clear Clear, Slightly Cloudy     Assessment / Plan   Impression(s):  Hang Loya was seen today for sexually transmitted diseases. Diagnoses and all orders for this visit:    Concern about STD in male without diagnosis  -     POCT Urinalysis no Micro  -     C.trachomatis N.gonorrhoeae DNA, Urine; Future  -     cefTRIAXone (ROCEPHIN) injection 500 mg  -     azithromycin (ZITHROMAX) tablet 1,000 mg    UA came back wnl in office. Urine also sent for GC/CT testing today.  I am going to cover pt for GC/CT today with IM Rocephin and oral Zithromax in office, potential reactions discussed. Advised to abstain from any intercourse for 10 days after treatment. Advise f/u with PCP if still symptomatic after 1 week. ED sooner if symptoms worsen or change. ED immediately with the development of fever, lethargy, pain with erection, abdominal pain, vomiting, or back pain. Pt states understanding and is in agreement with this care plan. All questions answered. Return if symptoms worsen or fail to improve. Electronically signed by JEANCARLOS Thorne CNP   DD: 4/11/22    **This report was transcribed using voice recognition software. Every effort was made to ensure accuracy; however, inadvertent computerized transcription errors may be present.

## 2022-04-14 LAB
C. TRACHOMATIS DNA ,URINE: NEGATIVE
N. GONORRHOEAE DNA, URINE: NEGATIVE
SOURCE: NORMAL

## 2022-04-20 ENCOUNTER — OFFICE VISIT (OUTPATIENT)
Dept: PRIMARY CARE CLINIC | Age: 47
End: 2022-04-20
Payer: COMMERCIAL

## 2022-04-20 VITALS
TEMPERATURE: 98.5 F | HEART RATE: 81 BPM | BODY MASS INDEX: 22.97 KG/M2 | RESPIRATION RATE: 18 BRPM | OXYGEN SATURATION: 97 % | HEIGHT: 74 IN | WEIGHT: 179 LBS | DIASTOLIC BLOOD PRESSURE: 83 MMHG | SYSTOLIC BLOOD PRESSURE: 134 MMHG

## 2022-04-20 DIAGNOSIS — L73.9 FOLLICULITIS: Primary | ICD-10-CM

## 2022-04-20 PROCEDURE — 99213 OFFICE O/P EST LOW 20 MIN: CPT | Performed by: NURSE PRACTITIONER

## 2022-04-20 PROCEDURE — G8420 CALC BMI NORM PARAMETERS: HCPCS | Performed by: NURSE PRACTITIONER

## 2022-04-20 PROCEDURE — 4004F PT TOBACCO SCREEN RCVD TLK: CPT | Performed by: NURSE PRACTITIONER

## 2022-04-20 PROCEDURE — G8427 DOCREV CUR MEDS BY ELIG CLIN: HCPCS | Performed by: NURSE PRACTITIONER

## 2022-04-20 RX ORDER — CEPHALEXIN 500 MG/1
500 CAPSULE ORAL 2 TIMES DAILY
Qty: 14 CAPSULE | Refills: 0 | Status: SHIPPED | OUTPATIENT
Start: 2022-04-20 | End: 2022-04-27

## 2022-04-20 NOTE — PROGRESS NOTES
Chief Complaint:   Other (ingrown hair on right side of face)    History of Present Illness   Source of history provided by:  patient. Mumtaz Wheatley is a 56 y.o. old male who presents to walk-in for evaluation of swelling to right jawline, which began 3 days ago. Patient reports he felt like he had a pimple, tried to pop it, and felt like fluid broke open under his skin, since then he did have some pain along his jaw and up to his ear with swelling that has since subsided. Denies lymphangitic streaking. Denies any bleeding or active drainage. Since onset, the symptoms have worsened. Denies any fever, chills, HA, recent illness, myalgias, nausea, vomiting, or lethargy. ROS    Unless otherwise stated in this report or unable to obtain because of the patient's clinical or mental status as evidenced by the medical record, this patients's positive and negative responses for Review of Systems, constitutional, psych, eyes, ENT, cardiovascular, respiratory, gastrointestinal, neurological, genitourinary, musculoskeletal, integument systems and systems related to the presenting problem are either stated in the preceding or were not pertinent or were negative for the symptoms and/or complaints related to the medical problem. Past Medical History:  has a past medical history of Back pain, Carpal tunnel syndrome on both sides, chronic, DeQuervain's disease (tenosynovitis), and Finger laceration. Past Surgical History:  has a past surgical history that includes Vasectomy (2007); Finger fracture surgery (1990); Carpal tunnel release; Ulnar tunnel release; Hemorrhoid surgery (02/20/2015); cyst removal (2007); Hemorrhoid surgery (N/A, 8/6/2021); and Hemorrhoid surgery (N/A, 9/10/2021). Social History:  reports that he has been smoking cigarettes and cigars. He has a 3.00 pack-year smoking history.  He has never used smokeless tobacco. He reports current alcohol use of about 2.0 standard drinks of alcohol per week. He reports current drug use. Drug: Marijuana Josesito Weeks). Family History: family history is not on file. Allergies: Patient has no known allergies. Physical Exam   Vital Signs:  /83   Pulse 81   Temp 98.5 °F (36.9 °C) (Temporal)   Resp 18   Ht 6' 2\" (1.88 m)   Wt 179 lb (81.2 kg)   SpO2 97%   BMI 22.98 kg/m²    Oxygen Saturation Interpretation: Normal.    Constitutional:  Alert, development consistent with age. NAD. Lungs:  CTAB without wheezing, rales, or rhonchi. Heart:  Regular rate and rhythm, no pathologic murmurs, rubs, or gallops. Skin:  Normal turgor and appropriately dry to touch. Raised region over the right jaw line along beard measuring approximately 0.5x0.5 cm in size, consistent with folliculits. No TTP or warmth over the same area. No drainage noted. No lymphangitic streaking. There is one small preauricular lymph node enlarged on right. Neurological:  Orientation age-appropriate unless noted elseware. Motor functions intact. Procedures   Risks, benefits and alternatives (for applicable procedures below) described. Lab / Imaging Results   (All laboratory and radiology results have been personally reviewed by myself)  Labs:  No results found for this visit on 04/20/22. Imaging: All Radiology results interpreted by Radiologist unless otherwise noted. No results found. Assessment / Plan   Impression(s):  Colletta Carpen was seen today for other. Diagnoses and all orders for this visit:    Folliculitis  -     cephALEXin (KEFLEX) 500 MG capsule; Take 1 capsule by mouth 2 times daily for 7 days    Scripts written for Keflex, side effects discussed. Patient advised if symptoms persist, follow up with PCP. ED sooner if symptoms worsen or change. ED immediately with the development of fever, body aches, shaking chills, lethargy, CP, or SOB. Pt is in agreement with this care plan. All questions answered. Return if symptoms worsen or fail to improve.     Electronically signed by JEANCARLOS Lock CNP   DD: 4/20/22    **This report was transcribed using voice recognition software. Every effort was made to ensure accuracy; however, inadvertent computerized transcription errors may be present.

## 2022-07-29 ENCOUNTER — OFFICE VISIT (OUTPATIENT)
Dept: PRIMARY CARE CLINIC | Age: 47
End: 2022-07-29
Payer: COMMERCIAL

## 2022-07-29 VITALS
HEART RATE: 59 BPM | HEIGHT: 74 IN | SYSTOLIC BLOOD PRESSURE: 111 MMHG | OXYGEN SATURATION: 100 % | BODY MASS INDEX: 21.43 KG/M2 | DIASTOLIC BLOOD PRESSURE: 74 MMHG | WEIGHT: 167 LBS | TEMPERATURE: 97.2 F

## 2022-07-29 DIAGNOSIS — M79.2 NERVE PAIN: ICD-10-CM

## 2022-07-29 DIAGNOSIS — M79.10 MYALGIA: ICD-10-CM

## 2022-07-29 DIAGNOSIS — M65.9 SYNOVITIS: ICD-10-CM

## 2022-07-29 DIAGNOSIS — M79.10 MYALGIA: Primary | ICD-10-CM

## 2022-07-29 LAB
ALBUMIN SERPL-MCNC: 4.6 G/DL (ref 3.5–5.2)
ALP BLD-CCNC: 41 U/L (ref 40–129)
ALT SERPL-CCNC: 17 U/L (ref 0–40)
ANION GAP SERPL CALCULATED.3IONS-SCNC: 11 MMOL/L (ref 7–16)
AST SERPL-CCNC: 18 U/L (ref 0–39)
BILIRUB SERPL-MCNC: 0.5 MG/DL (ref 0–1.2)
BUN BLDV-MCNC: 19 MG/DL (ref 6–20)
C-REACTIVE PROTEIN: 0.3 MG/DL (ref 0–0.4)
CALCIUM SERPL-MCNC: 9.3 MG/DL (ref 8.6–10.2)
CHLORIDE BLD-SCNC: 104 MMOL/L (ref 98–107)
CHOLESTEROL, TOTAL: 183 MG/DL (ref 0–199)
CO2: 26 MMOL/L (ref 22–29)
CREAT SERPL-MCNC: 1.3 MG/DL (ref 0.7–1.2)
GFR AFRICAN AMERICAN: >60
GFR NON-AFRICAN AMERICAN: >60 ML/MIN/1.73
GLUCOSE BLD-MCNC: 85 MG/DL (ref 74–99)
HDLC SERPL-MCNC: 69 MG/DL
LDL CHOLESTEROL CALCULATED: 103 MG/DL (ref 0–99)
MAGNESIUM: 2.1 MG/DL (ref 1.6–2.6)
POTASSIUM SERPL-SCNC: 4.9 MMOL/L (ref 3.5–5)
RHEUMATOID FACTOR: <10 IU/ML (ref 0–13)
SODIUM BLD-SCNC: 141 MMOL/L (ref 132–146)
TOTAL CK: 254 U/L (ref 20–200)
TOTAL PROTEIN: 6.9 G/DL (ref 6.4–8.3)
TRIGL SERPL-MCNC: 57 MG/DL (ref 0–149)
TSH SERPL DL<=0.05 MIU/L-ACNC: 0.83 UIU/ML (ref 0.27–4.2)
URIC ACID, SERUM: 5.6 MG/DL (ref 3.4–7)
VITAMIN D 25-HYDROXY: 21 NG/ML (ref 30–100)
VLDLC SERPL CALC-MCNC: 11 MG/DL

## 2022-07-29 PROCEDURE — G8427 DOCREV CUR MEDS BY ELIG CLIN: HCPCS | Performed by: FAMILY MEDICINE

## 2022-07-29 PROCEDURE — G8420 CALC BMI NORM PARAMETERS: HCPCS | Performed by: FAMILY MEDICINE

## 2022-07-29 PROCEDURE — 4004F PT TOBACCO SCREEN RCVD TLK: CPT | Performed by: FAMILY MEDICINE

## 2022-07-29 PROCEDURE — 99214 OFFICE O/P EST MOD 30 MIN: CPT | Performed by: FAMILY MEDICINE

## 2022-07-29 SDOH — ECONOMIC STABILITY: FOOD INSECURITY: WITHIN THE PAST 12 MONTHS, YOU WORRIED THAT YOUR FOOD WOULD RUN OUT BEFORE YOU GOT MONEY TO BUY MORE.: NEVER TRUE

## 2022-07-29 SDOH — ECONOMIC STABILITY: FOOD INSECURITY: WITHIN THE PAST 12 MONTHS, THE FOOD YOU BOUGHT JUST DIDN'T LAST AND YOU DIDN'T HAVE MONEY TO GET MORE.: NEVER TRUE

## 2022-07-29 ASSESSMENT — ENCOUNTER SYMPTOMS
RESPIRATORY NEGATIVE: 1
COLOR CHANGE: 0

## 2022-07-29 ASSESSMENT — PATIENT HEALTH QUESTIONNAIRE - PHQ9
SUM OF ALL RESPONSES TO PHQ QUESTIONS 1-9: 0
2. FEELING DOWN, DEPRESSED OR HOPELESS: 0
1. LITTLE INTEREST OR PLEASURE IN DOING THINGS: 0
SUM OF ALL RESPONSES TO PHQ9 QUESTIONS 1 & 2: 0

## 2022-07-29 ASSESSMENT — SOCIAL DETERMINANTS OF HEALTH (SDOH): HOW HARD IS IT FOR YOU TO PAY FOR THE VERY BASICS LIKE FOOD, HOUSING, MEDICAL CARE, AND HEATING?: NOT HARD AT ALL

## 2022-07-29 NOTE — PROGRESS NOTES
Pulse 59   Temp 97.2 °F (36.2 °C) (Temporal)   Ht 6' 2\" (1.88 m)   Wt 167 lb (75.8 kg)   SpO2 100%   BMI 21.44 kg/m²   Physical Exam  Constitutional:       General: He is not in acute distress. Appearance: Normal appearance. He is not ill-appearing or diaphoretic. Cardiovascular:      Rate and Rhythm: Normal rate and regular rhythm. Heart sounds: Normal heart sounds. Pulmonary:      Effort: Pulmonary effort is normal.      Breath sounds: Normal breath sounds. Musculoskeletal:         General: No swelling (no current synovitis), tenderness or deformity. Comments: No edema or weakness   Skin:     General: Skin is warm and dry. Findings: No erythema or rash. Neurological:      Mental Status: He is alert. The 10-year ASCVD risk score (Hamlet Campa et al., 2013) is: 5%    Values used to calculate the score:      Age: 56 years      Sex: Male      Is Non- : Yes      Diabetic: No      Tobacco smoker: Yes      Systolic Blood Pressure: 095 mmHg      Is BP treated: No      HDL Cholesterol: 65 mg/dL      Total Cholesterol: 156 mg/dL    ASSESSMENT/PLAN:    1. Myalgia  -     NIMA; Future  -     Rheumatoid Factor; Future  -     Cyclic Citrul Peptide Antibody, IgG; Future  -     Comprehensive Metabolic Panel; Future  -     Magnesium; Future  -     C-Reactive Protein; Future  -     Anti-Desiree 1 Antibody, IgG; Future  -     Anti-DNA Antibody, Double-Stranded; Future  -     Anti-RNP (PAMELA Ab); Future  -     CK; Future  -     Lipid Panel; Future  -     TSH; Future  -     Vitamin D 25 Hydroxy; Future  -     Uric Acid; Future  2. Nerve pain  -     NIMA; Future  -     Rheumatoid Factor; Future  -     Cyclic Citrul Peptide Antibody, IgG; Future  -     Comprehensive Metabolic Panel; Future  -     Magnesium; Future  -     C-Reactive Protein; Future  -     Anti-Desiree 1 Antibody, IgG; Future  -     Anti-DNA Antibody, Double-Stranded; Future  -     Anti-RNP (PAMELA Ab);  Future  -     CK; Future  -     Lipid Panel; Future  -     TSH; Future  -     Vitamin D 25 Hydroxy; Future  -     Uric Acid; Future  3. Synovitis  -     NIMA; Future  -     Rheumatoid Factor; Future  -     Cyclic Citrul Peptide Antibody, IgG; Future  -     Comprehensive Metabolic Panel; Future  -     Magnesium; Future  -     C-Reactive Protein; Future  -     Anti-Desiree 1 Antibody, IgG; Future  -     Anti-DNA Antibody, Double-Stranded; Future  -     Anti-RNP (PAMELA Ab); Future  -     CK; Future  -     Lipid Panel; Future  -     TSH; Future  -     Vitamin D 25 Hydroxy; Future  -     Uric Acid; Future    Start with labs. R/o autoimmune arthropathy, gout. R/o autoimmune myopathy. Labs per request.   Compounding cream for hands sent to [facility], OTC meds not helping with cramping, pain. Reviewed age and gender appropriate health screening exams and vaccinations. Advised patient regarding importance of keeping up with recommended health maintenance and to schedule as soon as possible if overdue, as this is important in assessing for undiagnosed pathology, especially cancer. Patient verbalizes understanding and agrees. Call or go to ED immediately if symptoms worsen or persist.  No follow-ups on file. Sooner if necessary. Counseled regarding above diagnosis(es), including possible risks and complications, especially if left uncontrolled. Counseled regarding the possible side effects, risks, benefits and alternatives to treatment; patient and/or guardian verbalizes understanding. Advised patient to call with any new medication issues. All questions answered.     Luis Nobles MD  7/29/22

## 2022-08-01 LAB
ANTI DNA DOUBLE STRANDED: NEGATIVE
ANTI JO-1 IGG: NEGATIVE
ANTI-NUCLEAR ANTIBODY (ANA): NEGATIVE
ENA TO RNP ANTIBODY: NEGATIVE

## 2022-08-02 LAB — CYCLIC CITRULLINATED PEPTIDE ANTIBODY IGG: 1.6 U/ML (ref 0–7)

## 2022-08-08 ENCOUNTER — TELEPHONE (OUTPATIENT)
Dept: PRIMARY CARE CLINIC | Age: 47
End: 2022-08-08

## 2022-08-08 NOTE — TELEPHONE ENCOUNTER
Patient states he is okay wit being referred to PMR as discussed vs Rheumatology. Please place referral so we can notify patient. Thanks so much.

## 2022-08-10 DIAGNOSIS — M79.10 MYALGIA: ICD-10-CM

## 2022-08-10 DIAGNOSIS — M79.2 NERVE PAIN: ICD-10-CM

## 2022-08-10 DIAGNOSIS — M79.641 HAND PAIN, RIGHT: ICD-10-CM

## 2022-08-10 DIAGNOSIS — M25.531 WRIST PAIN, RIGHT: Primary | ICD-10-CM

## 2022-08-10 DIAGNOSIS — R29.898 WEAKNESS OF BOTH LOWER EXTREMITIES: ICD-10-CM

## 2022-11-18 ENCOUNTER — TELEPHONE (OUTPATIENT)
Dept: PHYSICAL MEDICINE AND REHAB | Age: 47
End: 2022-11-18

## 2022-11-18 NOTE — TELEPHONE ENCOUNTER
Patient no showed two establishing appointments- states he was planning to call ahead of time but was stuck on the phone with unemployment. Asking to reschedule, please advise if ok to reschedule with Dr Chelsi Nj.

## 2022-12-08 ENCOUNTER — OFFICE VISIT (OUTPATIENT)
Dept: PRIMARY CARE CLINIC | Age: 47
End: 2022-12-08

## 2022-12-08 VITALS
RESPIRATION RATE: 16 BRPM | WEIGHT: 175 LBS | DIASTOLIC BLOOD PRESSURE: 80 MMHG | HEIGHT: 74 IN | TEMPERATURE: 98 F | BODY MASS INDEX: 22.46 KG/M2 | HEART RATE: 60 BPM | SYSTOLIC BLOOD PRESSURE: 123 MMHG | OXYGEN SATURATION: 98 %

## 2022-12-08 DIAGNOSIS — Z72.51 HIGH RISK HETEROSEXUAL BEHAVIOR: Primary | ICD-10-CM

## 2022-12-08 DIAGNOSIS — Z72.51 HIGH RISK HETEROSEXUAL BEHAVIOR: ICD-10-CM

## 2022-12-08 DIAGNOSIS — R52 BURNING PAIN: ICD-10-CM

## 2022-12-08 DIAGNOSIS — N48.89 IRRITATION OF PENIS: ICD-10-CM

## 2022-12-08 LAB
BILIRUBIN, POC: NORMAL
BLOOD URINE, POC: NORMAL
CLARITY, POC: CLEAR
COLOR, POC: CLEAR
GLUCOSE URINE, POC: NORMAL
KETONES, POC: NORMAL
LEUKOCYTE EST, POC: NORMAL
NITRITE, POC: NORMAL
PH, POC: 6
PROTEIN, POC: NORMAL
SPECIFIC GRAVITY, POC: 1.01
UROBILINOGEN, POC: NORMAL

## 2022-12-08 RX ORDER — CEFTRIAXONE 500 MG/1
500 INJECTION, POWDER, FOR SOLUTION INTRAMUSCULAR; INTRAVENOUS ONCE
Status: COMPLETED | OUTPATIENT
Start: 2022-12-08 | End: 2022-12-08

## 2022-12-08 RX ORDER — AZITHROMYCIN 500 MG/1
1000 TABLET, FILM COATED ORAL ONCE
Status: COMPLETED | OUTPATIENT
Start: 2022-12-08 | End: 2022-12-08

## 2022-12-08 RX ADMIN — AZITHROMYCIN 1000 MG: 500 TABLET, FILM COATED ORAL at 12:10

## 2022-12-08 RX ADMIN — CEFTRIAXONE 500 MG: 500 INJECTION, POWDER, FOR SOLUTION INTRAMUSCULAR; INTRAVENOUS at 12:06

## 2022-12-08 NOTE — PROGRESS NOTES
Chief Complaint:   Urinary Tract Infection (Has a hx of this pinching when he pees, it comes and goes. )    History of Present Illness   HPI:  John Garcia is a 56 y.o. male who presents to [facility] today for penile issues. Patient states that he feels a pinching sensation to the head of the penis. States that he did recently change his supplements when he works out. He has had this in the past at that time he was positive for chlamydia. He reports that he has recently started taking a multivitamin, vitamin D and supplements for working out and noted that the pinching sensation started after that. He also reports that he did recently have intercourse with his ex-girlfriend and was unprotected and is once again a concern for possible STD. He denies any penile discharge, burning with urination, painful urination, painful erections, pain with ejaculation, hematuria, fever, chills, nausea, vomiting, diarrhea or abdominal pain. Prior to Visit Medications    Medication Sig Taking? Authorizing Provider   meloxicam (MOBIC) 15 MG tablet Take 1 tablet by mouth daily Yes Jairon Sanabria MD   traZODone (DESYREL) 100 MG tablet Take 1 tablet by mouth nightly Yes JEANCARLOS Menchaca - CNP       Review of Systems   Unless otherwise stated in this report or unable to obtain because of the patient's clinical or mental status as evidenced by the medical record, this patients's positive and negative responses for Review of Systems, constitutional, psych, eyes, ENT, cardiovascular, respiratory, gastrointestinal, neurological, genitourinary, musculoskeletal, integument systems and systems related to the presenting problem are either stated in the preceding or were not pertinent or were negative for the symptoms and/or complaints related to the medical problem.     Past Medical History:  has a past medical history of Back pain, Carpal tunnel syndrome on both sides, chronic, DeQuervain's disease (tenosynovitis), and Finger laceration. Past Surgical History:  has a past surgical history that includes Vasectomy (2007); Finger fracture surgery (1990); Carpal tunnel release; Ulnar tunnel release; Hemorrhoid surgery (02/20/2015); cyst removal (2007); Hemorrhoid surgery (N/A, 8/6/2021); and Hemorrhoid surgery (N/A, 9/10/2021). Social History:  reports that he has been smoking cigarettes and cigars. He has a 3.00 pack-year smoking history. He has never used smokeless tobacco. He reports current alcohol use of about 2.0 standard drinks per week. He reports current drug use. Drug: Marijuana Seabron Barban). Family History: family history is not on file. Allergies: Patient has no known allergies. Physical Exam   Vital Signs:  /80   Pulse 60   Temp 98 °F (36.7 °C)   Resp 16   Ht 6' 2\" (1.88 m)   Wt 175 lb (79.4 kg)   SpO2 98%   BMI 22.47 kg/m²    Oxygen Saturation Interpretation: Normal.    Physical Exam  Vitals and nursing note reviewed. Constitutional:       Appearance: Normal appearance. He is well-developed. He is not toxic-appearing. HENT:      Head: Normocephalic and atraumatic. Right Ear: Hearing normal.      Left Ear: Hearing normal.      Nose: Nose normal.      Mouth/Throat:      Lips: Pink. Mouth: Mucous membranes are moist.      Pharynx: Oropharynx is clear. Uvula midline. Eyes:      General: Lids are normal.      Conjunctiva/sclera: Conjunctivae normal.      Pupils: Pupils are equal, round, and reactive to light. Neck:      Trachea: Trachea normal.   Cardiovascular:      Rate and Rhythm: Normal rate and regular rhythm. Pulses: Normal pulses. Heart sounds: Normal heart sounds. Pulmonary:      Effort: Pulmonary effort is normal.      Breath sounds: Normal breath sounds. Abdominal:      General: Abdomen is flat. Bowel sounds are normal.      Palpations: Abdomen is soft. Genitourinary:     Penis: Normal and circumcised. No erythema, tenderness, discharge, swelling or lesions. Testes: Normal.      Epididymis:      Right: Normal.      Left: Normal.      Comments: Intermittent pinching sensation around head of penis. Musculoskeletal:         General: Normal range of motion. Cervical back: Normal range of motion. Lymphadenopathy:      Cervical: No cervical adenopathy. Lower Body: Right inguinal adenopathy (small) present. Left inguinal adenopathy (small) present. Skin:     General: Skin is warm and dry. Capillary Refill: Capillary refill takes less than 2 seconds. Neurological:      Mental Status: He is alert and oriented to person, place, and time. Psychiatric:         Attention and Perception: Attention normal.         Mood and Affect: Mood normal.         Speech: Speech normal.         Behavior: Behavior normal.         Thought Content: Thought content normal.         Cognition and Memory: Cognition normal.         Judgment: Judgment normal.       Test Results Section   (All laboratory and radiology results have been personally reviewed by myself)  Labs:  Results for orders placed or performed in visit on 12/08/22   POCT Urinalysis no Micro   Result Value Ref Range    Color, UA clear     Clarity, UA clear     Glucose, UA POC neg     Bilirubin, UA neg     Ketones, UA neg     Spec Grav, UA 1.015     Blood, UA POC neg     pH, UA 6.0     Protein, UA POC neg     Urobilinogen, UA 0.2 e.u     Leukocytes, UA neg     Nitrite, UA neg       Imaging: All Radiology results interpreted by Radiologist unless otherwise noted. No results found. Medical Decision Making   MDM:   this is a 56 y.o. male who presents today for pinching sensation around the head of the penis that has been intermittent. Patient possibly concerned for STD, was recently with his ex girlfriend and had unprotected intercourse. Vital signs reviewed and found to be within normal limits. On exam there is no discharge from urethra or lesions. There are small inguinal lymph nodes palpable.  UA in office appears negative for UTI. Will send for culture and GC/CT testing. Pt is requesting to be treated for prophylactic STD in office. Rocephin IM and Azithromycin given in office. Advised to abstain from intercourse for 10 days from treatment. F/u with PCP or urology if symptoms persist. ER if symptoms change or worsen. Red flag symptoms discussed with patient. Patient verbalized understanding and is agreeable to plan of care. All questions answered. Assessment / Plan   Impression(s):  Kellie Holley was seen today for urinary tract infection. Diagnoses and all orders for this visit:    High risk heterosexual behavior  -     C.trachomatis N.gonorrhoeae DNA, Urine; Future  -     cefTRIAXone (ROCEPHIN) injection 500 mg  -     azithromycin (ZITHROMAX) tablet 1,000 mg    Irritation of penis  -     POCT Urinalysis no Micro  -     Culture, Urine; Future    Discharged home. Patient condition is good    Return if symptoms worsen or fail to improve. New Medications     New Prescriptions    No medications on file       Electronically signed by JEANCARLOS Dior CNP   DD: 12/8/22    **This report was transcribed using voice recognition software. Every effort was made to ensure accuracy; however, inadvertent computerized transcription errors may be present.

## 2022-12-10 LAB — URINE CULTURE, ROUTINE: NORMAL

## 2022-12-12 ENCOUNTER — TELEPHONE (OUTPATIENT)
Dept: PRIMARY CARE CLINIC | Age: 47
End: 2022-12-12

## 2022-12-12 LAB
C. TRACHOMATIS DNA ,URINE: NEGATIVE
N. GONORRHOEAE DNA, URINE: NEGATIVE
SOURCE: NORMAL

## 2022-12-12 NOTE — TELEPHONE ENCOUNTER
Returned patient call regarding test results. Notified patient his STD testing was negative as well as urine culture results. Stated he had no further questions.

## 2023-01-30 DIAGNOSIS — M54.50 CHRONIC BILATERAL LOW BACK PAIN WITHOUT SCIATICA: ICD-10-CM

## 2023-01-30 DIAGNOSIS — G89.29 CHRONIC BILATERAL LOW BACK PAIN WITHOUT SCIATICA: ICD-10-CM

## 2023-01-31 RX ORDER — MELOXICAM 15 MG/1
TABLET ORAL
Qty: 30 TABLET | Refills: 5 | Status: SHIPPED | OUTPATIENT
Start: 2023-01-31

## 2023-06-14 ENCOUNTER — APPOINTMENT (OUTPATIENT)
Dept: ULTRASOUND IMAGING | Age: 48
End: 2023-06-14
Payer: COMMERCIAL

## 2023-06-14 ENCOUNTER — HOSPITAL ENCOUNTER (EMERGENCY)
Age: 48
Discharge: HOME | End: 2023-06-14
Attending: EMERGENCY MEDICINE
Payer: COMMERCIAL

## 2023-06-14 ENCOUNTER — TELEPHONE (OUTPATIENT)
Dept: PRIMARY CARE CLINIC | Age: 48
End: 2023-06-14

## 2023-06-14 VITALS
HEART RATE: 54 BPM | WEIGHT: 175 LBS | OXYGEN SATURATION: 99 % | DIASTOLIC BLOOD PRESSURE: 97 MMHG | TEMPERATURE: 97.5 F | BODY MASS INDEX: 22.47 KG/M2 | SYSTOLIC BLOOD PRESSURE: 115 MMHG | RESPIRATION RATE: 18 BRPM

## 2023-06-14 DIAGNOSIS — R25.2 CRAMP IN LIMB: Primary | ICD-10-CM

## 2023-06-14 LAB
ALBUMIN SERPL-MCNC: 4.5 G/DL (ref 3.5–5.2)
ALP SERPL-CCNC: 43 U/L (ref 40–129)
ALT SERPL-CCNC: 18 U/L (ref 0–40)
ANION GAP SERPL CALCULATED.3IONS-SCNC: 12 MMOL/L (ref 7–16)
AST SERPL-CCNC: 27 U/L (ref 0–39)
BASOPHILS # BLD: 0.04 E9/L (ref 0–0.2)
BASOPHILS NFR BLD: 0.9 % (ref 0–2)
BILIRUB SERPL-MCNC: 0.9 MG/DL (ref 0–1.2)
BUN SERPL-MCNC: 14 MG/DL (ref 6–20)
CALCIUM SERPL-MCNC: 8.9 MG/DL (ref 8.6–10.2)
CHLORIDE SERPL-SCNC: 103 MMOL/L (ref 98–107)
CK SERPL-CCNC: 507 U/L (ref 20–200)
CO2 SERPL-SCNC: 24 MMOL/L (ref 22–29)
CREAT SERPL-MCNC: 1.1 MG/DL (ref 0.7–1.2)
EOSINOPHIL # BLD: 0.12 E9/L (ref 0.05–0.5)
EOSINOPHIL NFR BLD: 2.8 % (ref 0–6)
ERYTHROCYTE [DISTWIDTH] IN BLOOD BY AUTOMATED COUNT: 14.2 FL (ref 11.5–15)
GLUCOSE SERPL-MCNC: 84 MG/DL (ref 74–99)
HCT VFR BLD AUTO: 46.4 % (ref 37–54)
HGB BLD-MCNC: 14.9 G/DL (ref 12.5–16.5)
IMM GRANULOCYTES # BLD: 0 E9/L
IMM GRANULOCYTES NFR BLD: 0 % (ref 0–5)
LYMPHOCYTES # BLD: 1.52 E9/L (ref 1.5–4)
LYMPHOCYTES NFR BLD: 35.8 % (ref 20–42)
MCH RBC QN AUTO: 28.6 PG (ref 26–35)
MCHC RBC AUTO-ENTMCNC: 32.1 % (ref 32–34.5)
MCV RBC AUTO: 89.1 FL (ref 80–99.9)
MONOCYTES # BLD: 0.57 E9/L (ref 0.1–0.95)
MONOCYTES NFR BLD: 13.4 % (ref 2–12)
NEUTROPHILS # BLD: 1.99 E9/L (ref 1.8–7.3)
NEUTS SEG NFR BLD: 47.1 % (ref 43–80)
PLATELET # BLD AUTO: 276 E9/L (ref 130–450)
PMV BLD AUTO: 9.3 FL (ref 7–12)
POTASSIUM SERPL-SCNC: 4.7 MMOL/L (ref 3.5–5)
PROT SERPL-MCNC: 6.8 G/DL (ref 6.4–8.3)
RBC # BLD AUTO: 5.21 E12/L (ref 3.8–5.8)
SODIUM SERPL-SCNC: 139 MMOL/L (ref 132–146)
WBC # BLD: 4.2 E9/L (ref 4.5–11.5)

## 2023-06-14 PROCEDURE — 82550 ASSAY OF CK (CPK): CPT

## 2023-06-14 PROCEDURE — 80053 COMPREHEN METABOLIC PANEL: CPT

## 2023-06-14 PROCEDURE — 2580000003 HC RX 258: Performed by: EMERGENCY MEDICINE

## 2023-06-14 PROCEDURE — 99284 EMERGENCY DEPT VISIT MOD MDM: CPT

## 2023-06-14 PROCEDURE — 93970 EXTREMITY STUDY: CPT

## 2023-06-14 PROCEDURE — 85025 COMPLETE CBC W/AUTO DIFF WBC: CPT

## 2023-06-14 RX ORDER — 0.9 % SODIUM CHLORIDE 0.9 %
1000 INTRAVENOUS SOLUTION INTRAVENOUS ONCE
Status: COMPLETED | OUTPATIENT
Start: 2023-06-14 | End: 2023-06-14

## 2023-06-14 RX ADMIN — SODIUM CHLORIDE 1000 ML: 9 INJECTION, SOLUTION INTRAVENOUS at 12:55

## 2023-06-14 ASSESSMENT — PAIN - FUNCTIONAL ASSESSMENT: PAIN_FUNCTIONAL_ASSESSMENT: NONE - DENIES PAIN

## 2023-06-14 ASSESSMENT — LIFESTYLE VARIABLES
HOW OFTEN DO YOU HAVE A DRINK CONTAINING ALCOHOL: NEVER
HOW MANY STANDARD DRINKS CONTAINING ALCOHOL DO YOU HAVE ON A TYPICAL DAY: PATIENT DOES NOT DRINK

## 2023-06-14 NOTE — TELEPHONE ENCOUNTER
Patient called with symptoms of leg/arm cramps. Suggested he be seen as walk-in. He said he might go to the ER for testing.     .Electronically signed by Singh Zarco on 6/14/2023 at 11:01 AM

## 2023-09-20 ENCOUNTER — TELEPHONE (OUTPATIENT)
Dept: PRIMARY CARE CLINIC | Age: 48
End: 2023-09-20

## 2023-09-20 NOTE — TELEPHONE ENCOUNTER
Patient called in stating he went to the ED in June for leg cramps and they did ultrasound. He said that he has been having the same symptoms with some numbness and tingling only in his right mid thigh. He wants you to look at his ED visit and see what he can do about this issue.

## 2023-09-21 ENCOUNTER — OFFICE VISIT (OUTPATIENT)
Dept: PRIMARY CARE CLINIC | Age: 48
End: 2023-09-21
Payer: COMMERCIAL

## 2023-09-21 VITALS
BODY MASS INDEX: 22.46 KG/M2 | HEIGHT: 74 IN | OXYGEN SATURATION: 98 % | WEIGHT: 175 LBS | HEART RATE: 55 BPM | SYSTOLIC BLOOD PRESSURE: 122 MMHG | TEMPERATURE: 97.8 F | DIASTOLIC BLOOD PRESSURE: 72 MMHG | RESPIRATION RATE: 20 BRPM

## 2023-09-21 DIAGNOSIS — M79.2 NERVE PAIN: ICD-10-CM

## 2023-09-21 DIAGNOSIS — M79.10 MYALGIA: Primary | ICD-10-CM

## 2023-09-21 PROCEDURE — G8420 CALC BMI NORM PARAMETERS: HCPCS | Performed by: NURSE PRACTITIONER

## 2023-09-21 PROCEDURE — 4004F PT TOBACCO SCREEN RCVD TLK: CPT | Performed by: NURSE PRACTITIONER

## 2023-09-21 PROCEDURE — 99213 OFFICE O/P EST LOW 20 MIN: CPT | Performed by: NURSE PRACTITIONER

## 2023-09-21 PROCEDURE — G8427 DOCREV CUR MEDS BY ELIG CLIN: HCPCS | Performed by: NURSE PRACTITIONER

## 2023-09-21 NOTE — PROGRESS NOTES
Chief Complaint:   Leg Pain (R leg pain x 6-8 months, states it feels crampy. )      History of Present Illness   HPI:  Thierno Brooks is a 53 y.o. male who presents to [address] [place], [place] [address] today for right leg pain. Patient states this has been ongoing for several months now. States he has a numbness and tingling to the anterior thigh. States that does not radiate. It starts at the proximal thigh and goes to just above the right knee. He does also have some cramping to the medial thigh and states it \"locks up\". He denies any urinary or bowel incontinence. Denies any saddle anaesthesia. He states he has been trying to stay well hydrated. He was seen in the ER on 6/14/2023 for the same symptoms, they did labs and US which he states were all normal. He denies any recent injuries or trauma. Denies back pain. Prior to Visit Medications    Medication Sig Taking? Authorizing Provider   meloxicam (MOBIC) 15 MG tablet take 1 tablet by mouth once daily Yes Lord Aide MD   traZODone (DESYREL) 100 MG tablet Take 1 tablet by mouth nightly Yes Yesi Isabel, APRN - CNP     Review of Systems   Unless otherwise stated in this report or unable to obtain because of the patient's clinical or mental status as evidenced by the medical record, this patients's positive and negative responses for Review of Systems, constitutional, psych, eyes, ENT, cardiovascular, respiratory, gastrointestinal, neurological, genitourinary, musculoskeletal, integument systems and systems related to the presenting problem are either stated in the preceding or were not pertinent or were negative for the symptoms and/or complaints related to the medical problem. Past Medical History:  has a past medical history of Back pain, Carpal tunnel syndrome on both sides, chronic, DeQuervain's disease (tenosynovitis), and Finger laceration. Past Surgical History:  has a past surgical history that includes Vasectomy (2007);  Finger fracture surgery (1990);

## 2023-10-18 NOTE — DISCHARGE INSTR - COC
Child Life Progress Note    Name: Priscila Kumar  : 2021   Sex: female    Intro Statement: This Certified Child Life Specialist (CCLS) introduced self and services to Priscila, a 15 m.o. female and family.    Settings: Outpatient Clinic: orthopedic clinic    Baseline Temperament: Easy and adaptable    Normalization Provided: Toys and iPad/Video games    Procedure:  X-Ray    Caregiver(s) Present: Mother    Caregiver(s) Involvement: Present, Engaged, and Supportive    This CCLS met patient and family to provide support for patient's x-ray. Patient easily engaged with this CCLS, reaching out to play with pop-it and giving high-fives. Upon entering x-ray room patient easily transitioned to the exam bed sitting up but was tearful when lying down on exam bed. Caregiver presence, pop-its, and music videos helped calm patient and patient was able to successfully complete x-ray.     Outcome:   Patient has demonstrated developmentally appropriate reactions/responses to hospitalization. However, patient would benefit from psychological preparation and support for future healthcare encounters.        Time spent with the Patient: 20 minutes    Lidya Jones MS, CCLS  Certified Child Life Specialist  [facility]  Ext. [phone]           Continuity of Care Form    Patient Name: Leland Jarvis   :  1975  MRN:  [id number]    Admit date:  9/10/2021  Discharge date:  ***    Code Status Order: Full Code   Advance Directives:   Advance Care Flowsheet Documentation     Date/Time Healthcare Directive Type of Healthcare Directive Copy in [address] Agent's Name Healthcare Agent's Phone Number    21 1238  No, patient does not have an advance directive for healthcare treatment  --  --  --  --  --          Admitting Physician:  Rodrigo Lozano MD  PCP: Juan Pratt MD    Discharging Nurse: [facility] Unit/Room#: [facility]  Discharging Unit Phone Number: ***    Emergency Contact:   Extended Emergency Contact Information  Primary Emergency Contact: [address], [address] [address] Phone: [phone]  Mobile Phone: [phone]  Relation: Other    Past Surgical History:  Past Surgical History:   Procedure Laterality Date    CARPAL TUNNEL RELEASE      CYST REMOVAL      [address] [place] [address] SURGERY  2015    HEMORRHOID SURGERY N/A 2021    HEMORRHOIDECTOMY performed by Rodrigo Lozano MD at [address] [place] [address]       Immunization History:   Immunization History   Administered Date(s) Administered    Tdap (Boostrix, Adacel) 2012       Active Problems:  Patient Active Problem List   Diagnosis Code    Finger laceration S61.219A    De Quervain's disease (tenosynovitis) M65.4    Wrist pain, right M25.531    Hand pain, right M79.641    Submental abscess L02.01    Tenosynovitis M65.9    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    Carpal tunnel syndrome on both sides, chronic G56.03    Grade III hemorrhoids K64.2       Isolation/Infection:   Isolation          No Isolation        Patient Infection Status     None to display          Nurse Assessment:  Last Vital Signs: /80   Pulse 51   Temp 97.1 °F (36.2 °C) (Infrared)   Resp 16   Ht 6' 2\" (1.88 m)   Wt 176 lb (79.8 kg)   SpO2 97%   BMI 22.60 kg/m²     Last documented pain score (0-10 scale): Pain Level: 9  Last Weight:   Wt Readings from Last 1 Encounters:   21 176 lb (79.8 kg)     Mental Status:  {IP PT MENTAL STATUS:05685}    IV Access:  { DEONDRE IV ACCESS:172694102}    Nursing Mobility/ADLs:  Walking   {CHP DME QICA:180820056}  Transfer  {CHP DME ITOW:556539875}  Bathing  {CHP DME ORBX:128773887}  Dressing  {CHP DME PSKM:357543938}  Toileting  {CHP DME FTOB:594836202}  Feeding  {CHP DME IRXJ:102268450}  Med Admin  {CHP DME JKPS:893858253}  Med Delivery   { DEONDRE MED Delivery:497416122}    Wound Care Documentation and Therapy:  Incision 02/20/15 Rectum (Active)   Number of days: 2393        Elimination:  Continence:   · Bowel: {YES / HS:36539}  · Bladder: {YES / IK:71577}  Urinary Catheter: {Urinary Catheter:908710297}   Colostomy/Ileostomy/Ileal Conduit: {YES / JK:70894}       Date of Last BM: ***    Intake/Output Summary (Last 24 hours) at 9/10/2021 0944  Last data filed at 9/10/2021 0911  Gross per 24 hour   Intake 890 ml   Output --   Net 890 ml     No intake/output data recorded.     Safety Concerns:     [address] Safety Concerns:637615036}    Impairments/Disabilities:      [address] Impairments/Disabilities:218678201}    Nutrition Therapy:  Current Nutrition Therapy:   [address] Diet List:449750288}    Routes of Feeding: {P DME Other Feedings:362277937}  Liquids: {Slp liquid thickness:70697}  Daily Fluid Restriction: {CHP DME Yes amt example:747262396}  Last Modified Barium Swallow with Video (Video Swallowing Test): {Done Not Done XJPL:765324203}    Treatments at the Time of Hospital Discharge:   Respiratory Treatments: ***  Oxygen Therapy:  {Therapy; copd oxygen:95015}  Ventilator:    {PEÑA KU Vent GBFP:348293710}    Rehab Therapies: {THERAPEUTIC INTERVENTION:5342388416}  Weight Bearing Status/Restrictions: {PEÑA KU Weight Bearin}  Other Medical Equipment (for information only, NOT a DME order):  {EQUIPMENT:966581904}  Other Treatments: ***    Patient's personal belongings (please select all that are sent with patient):  {CHP DME Belongings:638279950}    RN SIGNATURE:  {Esignature:404237103}    CASE MANAGEMENT/SOCIAL WORK SECTION    Inpatient Status Date: ***    Readmission Risk Assessment Score:  Readmission Risk              Risk of Unplanned Readmission:  0           Discharging to Facility/ Agency   · Name:   · Address:  · Phone:  · Fax:    Dialysis Facility (if applicable)   · Name:  · Address:  · Dialysis Schedule:  · Phone:  · Fax:    / signature: {Esignature:908090035}    PHYSICIAN SECTION    Prognosis: {Prognosis:1772969727}    Condition at Discharge: [address] [place] [address] Patient Condition:141870228}    Rehab Potential (if transferring to Rehab): {Prognosis:8745825710}    Recommended Labs or Other Treatments After Discharge: ***    Physician Certification: I certify the above information and transfer of Tano Marin  is necessary for the continuing treatment of the diagnosis listed and that he requires {Admit to Appropriate Level of Care:96411} for {GREATER/LESS:496432780} 30 days.      Update Admission H&P: {CHP DME Changes in WOGYU:056605924}    PHYSICIAN SIGNATURE:  {Esignature:665884148}

## 2023-10-19 NOTE — PROGRESS NOTES
Atraumatic, normocephalic. EOMI grossly intact to tracking examiner, sclerae anicteric. THORACIC/PULM: No visible chest wall deformities. No tachypnea, no accessory muscle use at rest.  CARDIO & VASC: Regular rate and rhythm to peripheral pulse. Upper and lower limbs are warm, well perfused. No cyanosis, or edema. SKIN: Intact. No rashes, bruises, or ulcers over visible skin  PSYCH: Affect appears normal, mood congruent. MUSCULOSKELETAL  Inspection: Pelvis was symmetric. Lumbar lordotic curvature was within normal limits. There was no scoliosis. No ecchymoses or erythema. Palpation: Palpatory exam revealed no tenderness along lumbosacral paraspinals, midline spine, SI joint sulcus, greater trochanters and TFL on bilateral side. There was no paraspinal spasms. No masses felt on anterior right thigh. ROM: ROM revealed flexion intact and painless, extension intact and painless, left side bending intact and painless, right side bending intact and painless. No pain with facet loading. Special/provocative testing:   Supine SLR negative   Seating SLR negative. negative FABERS.   negative Segmental tenderness of the lumbar spine. negative Seated leg raise when distracted. negative Lumbar pain with axial loading. There was no leg length discrepancy. Hamstrings: no tightness noted bilaterally. Piriformis: mild tightness noted bilaterally. NEURO  Reflexes   LEFT RIGHT   Patellar 1+/4 1+/4   Achilles 2+/4 2+/4     Ankle clonus  LEFT RIGHT   No No     Babinski  LEFT RIGHT   - -       Upper Limb Strength - antigravity strength observed in clinic    Lower Limb Strength    LEFT RIGHT   Hip Flexion 5/5 5/5   Knee Extension 5/5 5/5   Knee Flexion 5/5 5/5   Ankle Dorsiflexion 5/5 5/5   1st toe Extension 5/5 5/5   Ankle Plantarflexion 5/5 5/5   Ankle Inversion 5/5 5/5   Ankle Eversion 5/5 5/5     Labs/Imaging Reviewed:  Labs and imaging personally reviewed.  Relevant diagnostic studies were also reviewed with

## 2023-10-20 ENCOUNTER — OFFICE VISIT (OUTPATIENT)
Dept: PHYSICAL MEDICINE AND REHAB | Age: 48
End: 2023-10-20
Payer: COMMERCIAL

## 2023-10-20 VITALS
BODY MASS INDEX: 22.46 KG/M2 | WEIGHT: 175 LBS | SYSTOLIC BLOOD PRESSURE: 113 MMHG | DIASTOLIC BLOOD PRESSURE: 69 MMHG | HEART RATE: 57 BPM | HEIGHT: 74 IN

## 2023-10-20 DIAGNOSIS — M54.16 LUMBAR RADICULITIS: Primary | ICD-10-CM

## 2023-10-20 DIAGNOSIS — R20.0 NUMBNESS AND TINGLING OF RIGHT LEG: ICD-10-CM

## 2023-10-20 DIAGNOSIS — R20.2 NUMBNESS AND TINGLING OF RIGHT LEG: ICD-10-CM

## 2023-10-20 PROCEDURE — G8427 DOCREV CUR MEDS BY ELIG CLIN: HCPCS | Performed by: STUDENT IN AN ORGANIZED HEALTH CARE EDUCATION/TRAINING PROGRAM

## 2023-10-20 PROCEDURE — G8484 FLU IMMUNIZE NO ADMIN: HCPCS | Performed by: STUDENT IN AN ORGANIZED HEALTH CARE EDUCATION/TRAINING PROGRAM

## 2023-10-20 PROCEDURE — 4004F PT TOBACCO SCREEN RCVD TLK: CPT | Performed by: STUDENT IN AN ORGANIZED HEALTH CARE EDUCATION/TRAINING PROGRAM

## 2023-10-20 PROCEDURE — G8420 CALC BMI NORM PARAMETERS: HCPCS | Performed by: STUDENT IN AN ORGANIZED HEALTH CARE EDUCATION/TRAINING PROGRAM

## 2023-10-20 PROCEDURE — 99202 OFFICE O/P NEW SF 15 MIN: CPT | Performed by: STUDENT IN AN ORGANIZED HEALTH CARE EDUCATION/TRAINING PROGRAM

## 2023-10-20 NOTE — PATIENT INSTRUCTIONS
- Come back next week to get xrays of your back - we will call you with results  - Ok to use the compound cream that you already have on your right leg   - Therapy prescription will be sent to [facility]  - Return in 8 weeks, if not better, we will get a nerve test

## 2023-10-26 ENCOUNTER — HOSPITAL ENCOUNTER (OUTPATIENT)
Dept: GENERAL RADIOLOGY | Age: 48
Discharge: HOME | End: 2023-10-28
Payer: COMMERCIAL

## 2023-10-26 ENCOUNTER — HOSPITAL ENCOUNTER (OUTPATIENT)
Age: 48
Discharge: HOME | End: 2023-10-28
Payer: COMMERCIAL

## 2023-10-26 DIAGNOSIS — R20.0 NUMBNESS AND TINGLING OF RIGHT LEG: ICD-10-CM

## 2023-10-26 DIAGNOSIS — M54.16 LUMBAR RADICULITIS: ICD-10-CM

## 2023-10-26 DIAGNOSIS — R20.2 NUMBNESS AND TINGLING OF RIGHT LEG: ICD-10-CM

## 2023-10-26 PROCEDURE — 72110 X-RAY EXAM L-2 SPINE 4/>VWS: CPT

## 2023-10-30 ENCOUNTER — TELEPHONE (OUTPATIENT)
Dept: PHYSICAL MEDICINE AND REHAB | Age: 48
End: 2023-10-30

## 2023-10-30 NOTE — TELEPHONE ENCOUNTER
Called patient to inform him of lumbar spine XR. He started PT in community and starting to feel better. He will follow up after PT in our office.

## 2023-11-07 DIAGNOSIS — G89.29 CHRONIC BILATERAL LOW BACK PAIN WITHOUT SCIATICA: ICD-10-CM

## 2023-11-07 DIAGNOSIS — M54.50 CHRONIC BILATERAL LOW BACK PAIN WITHOUT SCIATICA: ICD-10-CM

## 2023-11-07 NOTE — TELEPHONE ENCOUNTER
meloxicam (MOBIC) 15 MG tablet    Last Appointment:  7/29/2022  Future Appointments   Date Time Provider [address]   12/15/2023  2:00 PM Cherry Pineda MD [facility] 2 [facility]

## 2023-11-09 RX ORDER — MELOXICAM 15 MG/1
TABLET ORAL
Qty: 30 TABLET | Refills: 5 | Status: SHIPPED | OUTPATIENT
Start: 2023-11-09

## 2024-01-24 ENCOUNTER — OFFICE VISIT (OUTPATIENT)
Dept: PHYSICAL MEDICINE AND REHAB | Age: 49
End: 2024-01-24
Payer: COMMERCIAL

## 2024-01-24 VITALS
HEART RATE: 66 BPM | SYSTOLIC BLOOD PRESSURE: 129 MMHG | DIASTOLIC BLOOD PRESSURE: 75 MMHG | BODY MASS INDEX: 22.2 KG/M2 | HEIGHT: 74 IN | WEIGHT: 173 LBS

## 2024-01-24 DIAGNOSIS — M54.50 CHRONIC BILATERAL LOW BACK PAIN WITHOUT SCIATICA: ICD-10-CM

## 2024-01-24 DIAGNOSIS — M51.36 LUMBAR DEGENERATIVE DISC DISEASE: ICD-10-CM

## 2024-01-24 DIAGNOSIS — R20.0 NUMBNESS AND TINGLING OF RIGHT LEG: ICD-10-CM

## 2024-01-24 DIAGNOSIS — G89.29 CHRONIC BILATERAL LOW BACK PAIN WITHOUT SCIATICA: ICD-10-CM

## 2024-01-24 DIAGNOSIS — M54.16 LUMBAR RADICULITIS: Primary | ICD-10-CM

## 2024-01-24 DIAGNOSIS — R20.2 NUMBNESS AND TINGLING OF RIGHT LEG: ICD-10-CM

## 2024-01-24 PROCEDURE — 99213 OFFICE O/P EST LOW 20 MIN: CPT | Performed by: STUDENT IN AN ORGANIZED HEALTH CARE EDUCATION/TRAINING PROGRAM

## 2024-01-24 NOTE — PROGRESS NOTES
Ana Sanchez MD  Physical Medicine & Rehabilitation  [address] [address]  [phone]    1/24/24    Chief Complaint   Patient presents with    Back Pain     Follow up lumbar radiculitis       HPI:  Neftaly Finch is a 48 y.o. year old man seen today in follow up regarding right anterior thigh paresthesias and low back pain \"locking up.\"     Interval history: Since the last visit the patient he states that he was going to Chiropractor and PT with home exercises.  He states that he continues to have severe burning and shooting pain in the right anterior thigh and states that he is having anterior hip pain along the front, worse with flexion of the right hip and feels like the thigh is going to \"lock up\" and happens intermittent not sure any movements worsen this, but felt it was worse with driving recently    He went online and found some other exercises to perform at home which he has been doing at home.  Today, the back pain is rated Pain Score:   8 where 0 is no pain and 10 is pain as bad as it can be. The pain is located in the bilateral low back,  radiates distally on the RIGHT and LEFT along the lateral legs past the knee and present along anterolateral foot, and is described as shooting pain.  This pain occurs intermittently with specific movements. The symptoms have been worse since onset.  Symptoms are exacerbated by prolonged sitting.    Does not feel like Meloxicam helps back pain, only hand pain. Has not tried gabapentin in the past as he was worried because someone he knew had side effects of nightmares and someone else he knew had some issues with misuse.    He feels like he needs disability/to be off work due to the above complaints.     Past Medical History:   Diagnosis Date    Back pain     due to foot problems hallux valgus  plantar fasciitis    Carpal tunnel syndrome on both sides, chronic     DeQuervain's disease (tenosynovitis) 03/21/2011    LEFT    Finger laceration

## 2024-01-24 NOTE — PATIENT INSTRUCTIONS
- MRI of low back ordered today, we will follow up after the MRI  - Referral sent to Ortho/Sports Medicine to evaluate your right groin, hip region  - We can consider nerve test based on MRI and what they say  - Can consider adding Gabapentin for nerve pain  - Exercises provided today for the low back to do at home

## 2024-01-26 ENCOUNTER — OFFICE VISIT (OUTPATIENT)
Dept: ORTHOPEDIC SURGERY | Age: 49
End: 2024-01-26

## 2024-01-26 VITALS — WEIGHT: 173 LBS | HEIGHT: 74 IN | BODY MASS INDEX: 22.2 KG/M2

## 2024-01-26 DIAGNOSIS — M25.551 RIGHT HIP PAIN: Primary | ICD-10-CM

## 2024-01-26 RX ORDER — LIDOCAINE HYDROCHLORIDE 10 MG/ML
5 INJECTION, SOLUTION INFILTRATION; PERINEURAL ONCE
Status: COMPLETED | OUTPATIENT
Start: 2024-01-26 | End: 2024-01-26

## 2024-01-26 RX ORDER — TRIAMCINOLONE ACETONIDE 40 MG/ML
40 INJECTION, SUSPENSION INTRA-ARTICULAR; INTRAMUSCULAR ONCE
Status: COMPLETED | OUTPATIENT
Start: 2024-01-26 | End: 2024-01-26

## 2024-01-26 RX ADMIN — TRIAMCINOLONE ACETONIDE 40 MG: 40 INJECTION, SUSPENSION INTRA-ARTICULAR; INTRAMUSCULAR at 13:46

## 2024-01-26 RX ADMIN — LIDOCAINE HYDROCHLORIDE 5 ML: 10 INJECTION, SOLUTION INFILTRATION; PERINEURAL at 13:46

## 2024-01-26 NOTE — PROGRESS NOTES
PROCEDURE NOTE:    DIAGNOSIS      RIGHT hip pain    PROCEDURE     Ultrasound-guided RIGHT femoroacetabular?(hip) joint corticosteroid injection.     PROCEDURAL PAUSE     Procedural pause conducted to verify: ?correct patient identity, procedure to be performed, and as applicable, correct side and site, correct patient position, and availability of implants, special equipment, or special requirements.     PROCEDURE DETAILS     The procedure was carried out under sterile technique.      Patient Position: ?Supine.     Localization Process: ?The hip joint was evaluated under ultrasound prior to starting the procedure. ?The neurovascular bundle (femoral nerve, artery, vein) was identified under ultrasound prior to starting the procedure. ?The skin was prepped with Betadine and Alcohol.    Approach: ?In-plane.     Local Anesthesia: Under live ultrasound guidance, local anesthesia was obtained with vapocoolant cold spray and 2 cc of 1% lidocaine using a 25-gauge 2-inch needle advanced from an in-plane approach to the hip joint capsule at the femoral head-neck junction. ?     Injection/Aspiration: ?A 22-gauge 3-1/2-inch needle was advanced from an in-plane approach into the hip joint. ?Os was contacted at the femoral head-neck junction. ?After visualization of the needle tip in the target area and negative aspiration for blood, a mixture of 3 cc of 1% lidocaine and 1 cc of Kenalog (40 mg/cc) was injected into the hip joint with excellent sonographic flow. Images of procedure were permanently recorded.    Postprocedure Care: ?The patient will avoid soaking the hip under water for two days and avoid heavy exertion with the hip. ?The patient will contact me with any problems related to the injection.     PATIENT EDUCATION     Ready to learn, no apparent learning barriers were identified; learning preferences include listening. ?Explained diagnosis and treatment plan; patient expressed understanding of the content.     INFORMED 
Allergies    Medication List :    Current Outpatient Medications   Medication Sig Dispense Refill    meloxicam (MOBIC) 15 MG tablet take 1 tablet by mouth once daily 30 tablet 5    traZODone (DESYREL) 100 MG tablet Take 1 tablet by mouth nightly 90 tablet 1     No current facility-administered medications for this visit.      ______________________________________________________________________    Physical Exam :    Vitals: Ht 1.88 m (6' 2\")   Wt 78.5 kg (173 lb)   BMI 22.21 kg/m²   General Appearance: Nontoxic, awake, alert, and in no acute distress.  Chest wall/Lung: Respirations regular and unlabored. No cyanosis.  Heart: RRR, distal pulses intact.  Neurologic: Alert&Oriented x3. Sensation grossly intact. No focal motor deficits detected.  Musculoskeletal: RIGHT Hip:  ROM: Forward flexion to 120, IR 45, ER 45  TTP: ( - ) Greater trochanter, ( - ) Hip flexors, ( - ) SI Joint  Special tests: ( - ) Logroll, ( + ) FADIR, ( - ) PRESTON, ( - ) SLR, ( - ) Stinchfield, ( - ) Pelvic shear   ______________________________________________________________________    Assessment & Plan :    1. Right hip pain  Patient presents to the office today for evaluation of right hip pain.  History, referring provider note, physical exam and imaging (as interpreted by me) are consistent with possible acetabular labral pathology vs femoral acetabular impingement syndrome with superimposed lumbar radiculopathy.  Treatment options discussed with patient in the office today including activity modification, oral anti-inflammatories, physical therapy, injection options, advanced imaging in the form of a MRI and referral to an orthopedic surgeon for discussion of surgical opinion. Patient wishes to proceed with conservative treatment in the form of a diagnostic ultrasound-guided corticosteroid injection which was performed in the office today, please see separate procedure note for further details.  Patient will follow up in 6 weeks for

## 2024-02-14 ENCOUNTER — HOSPITAL ENCOUNTER (OUTPATIENT)
Dept: MRI IMAGING | Age: 49
Discharge: HOME | End: 2024-02-16
Attending: STUDENT IN AN ORGANIZED HEALTH CARE EDUCATION/TRAINING PROGRAM
Payer: COMMERCIAL

## 2024-02-14 DIAGNOSIS — G89.29 CHRONIC BILATERAL LOW BACK PAIN WITHOUT SCIATICA: ICD-10-CM

## 2024-02-14 DIAGNOSIS — R20.0 NUMBNESS AND TINGLING OF RIGHT LEG: ICD-10-CM

## 2024-02-14 DIAGNOSIS — M54.50 CHRONIC BILATERAL LOW BACK PAIN WITHOUT SCIATICA: ICD-10-CM

## 2024-02-14 DIAGNOSIS — M54.16 LUMBAR RADICULITIS: ICD-10-CM

## 2024-02-14 DIAGNOSIS — R20.2 NUMBNESS AND TINGLING OF RIGHT LEG: ICD-10-CM

## 2024-02-14 PROCEDURE — 72148 MRI LUMBAR SPINE W/O DYE: CPT

## 2024-03-06 ENCOUNTER — OFFICE VISIT (OUTPATIENT)
Dept: PHYSICAL MEDICINE AND REHAB | Age: 49
End: 2024-03-06
Payer: COMMERCIAL

## 2024-03-06 VITALS
HEART RATE: 79 BPM | SYSTOLIC BLOOD PRESSURE: 122 MMHG | OXYGEN SATURATION: 96 % | WEIGHT: 170 LBS | BODY MASS INDEX: 21.82 KG/M2 | DIASTOLIC BLOOD PRESSURE: 64 MMHG | HEIGHT: 74 IN

## 2024-03-06 DIAGNOSIS — M25.551 RIGHT HIP PAIN: ICD-10-CM

## 2024-03-06 DIAGNOSIS — M54.50 CHRONIC BILATERAL LOW BACK PAIN WITHOUT SCIATICA: Primary | ICD-10-CM

## 2024-03-06 DIAGNOSIS — G89.29 CHRONIC BILATERAL LOW BACK PAIN WITHOUT SCIATICA: Primary | ICD-10-CM

## 2024-03-06 DIAGNOSIS — M48.061 NEURAL FORAMINAL STENOSIS OF LUMBAR SPINE: ICD-10-CM

## 2024-03-06 DIAGNOSIS — M51.36 LUMBAR DEGENERATIVE DISC DISEASE: ICD-10-CM

## 2024-03-06 PROCEDURE — 99213 OFFICE O/P EST LOW 20 MIN: CPT | Performed by: STUDENT IN AN ORGANIZED HEALTH CARE EDUCATION/TRAINING PROGRAM

## 2024-03-06 NOTE — PROGRESS NOTES
paraspinal mass identified.     L1-L2: There is no significant disc herniation, spinal canal stenosis or  neural foraminal narrowing.     L2-L3: There is no significant disc herniation, spinal canal stenosis or  neural foraminal narrowing.     L3-L4: No significant disc herniation.  Mild facet hypertrophy.  No  significant central canal or lateral recess stenosis.  Mild left neural  foraminal stenosis.     L4-L5: Disc desiccation scratch the no significant disc herniation.  Mild  facet hypertrophy.  No significant central canal, lateral recess or neural  foraminal stenoses.     L5-S1: Prominent loss of disc height with a disc osteophyte complex.  No  significant central canal stenosis.  Mild lateral recess stenoses.  Severe  neural foraminal stenoses.     IMPRESSION:  1. Severe neural foraminal stenoses at L5-S1 level. Mild left neural  foraminal stenosis at L3-4.  2. No significant central canal stenosis. Mild lateral recess stenoses at  L5-S1.       Impression/Plan:  1. Chronic bilateral low back pain without sciatica    2. Lumbar degenerative disc disease    3. Right hip pain        Casey was seen today as a follow-up for right anterior groin and thigh pain/paresthesias as well as chronic low back pain and MRI results.  He underwent injection of right hip in late January with sports medicine but did not feel like he got much relief. I did discuss MRI lumbar spine results with Neftaly today including his neural foraminal stenosis at L5-S1 and mild left neural foraminal stenosis at L3-L4. His intermittent right anterior thigh/groin pain is not explained by his lumbar MRI findings. I did encourage Neftaly to return to Sports Medicine as well to see if there is additional testing or management for his right anterior groin pain.     I also discussed with him a referral to Pain Management for consideration of additional interventions, especially for his chronic low back pain which is controlled at this time. Referral

## 2024-03-20 ENCOUNTER — TELEPHONE (OUTPATIENT)
Dept: PHYSICAL MEDICINE AND REHAB | Age: 49
End: 2024-03-20

## 2024-03-20 NOTE — TELEPHONE ENCOUNTER
Pt notified provider sent out a  referral for pain management and he should be receiving a call from their office in the near future.  Pt verbally understood

## 2024-04-02 ENCOUNTER — OFFICE VISIT (OUTPATIENT)
Age: 49
End: 2024-04-02
Payer: COMMERCIAL

## 2024-04-02 VITALS
RESPIRATION RATE: 16 BRPM | BODY MASS INDEX: 22.59 KG/M2 | WEIGHT: 176 LBS | TEMPERATURE: 98.2 F | OXYGEN SATURATION: 97 % | HEIGHT: 74 IN

## 2024-04-02 DIAGNOSIS — M51.36 DDD (DEGENERATIVE DISC DISEASE), LUMBAR: ICD-10-CM

## 2024-04-02 DIAGNOSIS — G57.11 MERALGIA PARESTHETICA OF RIGHT SIDE: ICD-10-CM

## 2024-04-02 DIAGNOSIS — M54.41 CHRONIC BILATERAL LOW BACK PAIN WITH RIGHT-SIDED SCIATICA: ICD-10-CM

## 2024-04-02 DIAGNOSIS — G89.29 CHRONIC BILATERAL LOW BACK PAIN WITH RIGHT-SIDED SCIATICA: ICD-10-CM

## 2024-04-02 DIAGNOSIS — M54.16 LUMBAR RADICULOPATHY: Primary | ICD-10-CM

## 2024-04-02 PROCEDURE — 99204 OFFICE O/P NEW MOD 45 MIN: CPT | Performed by: STUDENT IN AN ORGANIZED HEALTH CARE EDUCATION/TRAINING PROGRAM

## 2024-04-02 RX ORDER — SODIUM CHLORIDE 0.9 % (FLUSH) 0.9 %
5-40 SYRINGE (ML) INJECTION PRN
OUTPATIENT
Start: 2024-04-02

## 2024-04-02 RX ORDER — SODIUM CHLORIDE 0.9 % (FLUSH) 0.9 %
5-40 SYRINGE (ML) INJECTION EVERY 12 HOURS SCHEDULED
OUTPATIENT
Start: 2024-04-02

## 2024-04-02 RX ORDER — SODIUM CHLORIDE 9 MG/ML
INJECTION, SOLUTION INTRAVENOUS PRN
OUTPATIENT
Start: 2024-04-02

## 2024-04-02 NOTE — PROGRESS NOTES
[facility] - Pain Medicine  [address] [place] [address]   Pain Medicine Consult Note    Patient:  Netfaly Finch,  1975  Date of Service:  24  Requesting Physician:  Ana Sanchez MD  Chief Complaint: New Patient (Right thigh )      HISTORY OF PRESENT ILLNESS:      Mr. Neftaly Finch is a 48 y.o. male presented today for evaluation of  right leg/thigh that has been ongoing for the past year    He   has a past medical history of B/L CTS, DeQuervain's disease, hallux valgus, plantar fascitis  .     Seen by Dr. Sanchez PMR  Seen by Dr. Mojica - Sports Med - Had R hip inj       Pain:  Location: right leg  Inciting Factor: bending  Duration: 1 years  Description: persistent  Quality: aching and throbbing.    Level (worst): 9  Radiation:  yes - RLE to knee  Numbness/Tingling: yes - RLE to knee  Aggravating factors: movement, walking, standing.   Alleviating factors: nothing.    Blood Thinners/Anticoagulation:  no  Herbal Supplements: no  Pertinent Allergies: no  Diabetic: no  Bowel/Bladder Incontinence: no    Medications:    NSAID's : yes - Mobic  , Ibu  APAPs: no   Patches/Gels: no   Membrane stabilizers :   Current - no   Previous - no   Opioids :   Current - no   Previous - no   Muscle Relaxants: no  Steroids: no   Benzodiazepines: no   Anti-depres/Anti-Pscyh: yes - Trazodone    Adjuvants or Others : no      Previous treatments:    Physical Therapy : yes,   Chiropractic treatment: no   TENS Unit: yes, home   Surgeries: no   Interventional Pain procedures/ nerve blocks: yes, w/sport med   Previous Pain Physicians: no   Any Discharges: no   Epidurals: no   Hip inj - R    Current Medications:   meloxicam and traZODone     Social History:  Work Hx: employed   Currently in Litigation: denies    H/O Smoking: yes , current   H/O alcohol abuse : denies   H/O Illicit drug use : medical marijuana    Social History     Substance and Sexual Activity   Drug Use Yes    Types:

## 2024-04-02 NOTE — PROGRESS NOTES
Patient:  Neftaly Finch,  1975  Date of Service:  24      Patient presents to [facility] with complaints of right thigh  pain that started 1 years ago and has been getting worse.     He states the pain began following No specific cause    Pain is constant and is described as aching, throbbing, shooting, stabbing, and numb. He rates the pain as a 9/10 on his worst day , 4/10 on his best day, and a 6/10 on average on the VAS scale.     Pain does radiate to right leg. He  has numbness, tingling of the right leg.    Alleviating factors include: nothing.  Aggravating factors include:  movement, walking, standing, bending. He states that the pain does not keep him from sleeping at night. He took his last dose of Mobic this morning .     He is not on NSAIDS and  is not on anticoagulation medications       Previous treatments: Physical Therapy.      Personal Expectations from this treatment: increase activity and decrease pain    Temp 98.2 °F (36.8 °C) (Temporal)   Resp 16   Ht 1.88 m (6' 2\")   Wt 79.8 kg (176 lb)   SpO2 97%   BMI 22.60 kg/m²     No LMP for male patient.

## 2024-04-12 ENCOUNTER — TELEPHONE (OUTPATIENT)
Dept: PAIN MANAGEMENT | Age: 49
End: 2024-04-12

## 2024-04-12 DIAGNOSIS — M54.16 LUMBAR RADICULOPATHY: ICD-10-CM

## 2024-04-12 NOTE — TELEPHONE ENCOUNTER
Call to Neftaly GANT Stef that procedure was approved for 4/19/2024 and that [facility] should call him a few days before for the pre op call and between 2:00 PM and 4:00 PM  the business day before with the arrival time. Instructed Neftaly to hold ibuprofen for 24 hours, naprosyn and mobic for 4 days and any aspirin containing products or fish oil for 7 days. Instructed to call office back if any questions. Neftaly verbalized understanding.    Electronically signed by Shon Celestin RN on 4/12/2024 at 10:22 AM

## 2024-04-16 NOTE — PROGRESS NOTES
[facility] PAIN MANAGEMENT  INSTRUCTIONS  ...........................................................................................................................................     [x] Parking the day of Surgery is located in the Main Entrance lot.  Upon entering the door, make immediate right into the surgery reception room    [x]  Bring photo ID and insurance card     [x] You may have a light breakfast day of procedure    [x]  Wear loose comfortable clothing    [x]  Please follow instructions for medications as given per Dr's office    [x] You can expect a call the business day prior to procedure to notify you of your arrival time     [x] Please arrange for     []  Other instructions

## 2024-04-19 ENCOUNTER — HOSPITAL ENCOUNTER (OUTPATIENT)
Dept: GENERAL RADIOLOGY | Age: 49
Setting detail: OUTPATIENT SURGERY
End: 2024-04-19
Attending: STUDENT IN AN ORGANIZED HEALTH CARE EDUCATION/TRAINING PROGRAM
Payer: COMMERCIAL

## 2024-04-19 ENCOUNTER — HOSPITAL ENCOUNTER (OUTPATIENT)
Age: 49
Setting detail: OUTPATIENT SURGERY
Discharge: HOME | End: 2024-04-19
Attending: STUDENT IN AN ORGANIZED HEALTH CARE EDUCATION/TRAINING PROGRAM | Admitting: STUDENT IN AN ORGANIZED HEALTH CARE EDUCATION/TRAINING PROGRAM
Payer: COMMERCIAL

## 2024-04-19 VITALS
DIASTOLIC BLOOD PRESSURE: 63 MMHG | HEIGHT: 74 IN | WEIGHT: 176 LBS | OXYGEN SATURATION: 99 % | SYSTOLIC BLOOD PRESSURE: 137 MMHG | BODY MASS INDEX: 22.59 KG/M2 | HEART RATE: 66 BPM | TEMPERATURE: 97.8 F | RESPIRATION RATE: 18 BRPM

## 2024-04-19 DIAGNOSIS — R52 PAIN MANAGEMENT: ICD-10-CM

## 2024-04-19 PROCEDURE — 2709999900 HC NON-CHARGEABLE SUPPLY: Performed by: STUDENT IN AN ORGANIZED HEALTH CARE EDUCATION/TRAINING PROGRAM

## 2024-04-19 PROCEDURE — 7100000010 HC PHASE II RECOVERY - FIRST 15 MIN: Performed by: STUDENT IN AN ORGANIZED HEALTH CARE EDUCATION/TRAINING PROGRAM

## 2024-04-19 PROCEDURE — 64483 NJX AA&/STRD TFRM EPI L/S 1: CPT | Performed by: STUDENT IN AN ORGANIZED HEALTH CARE EDUCATION/TRAINING PROGRAM

## 2024-04-19 PROCEDURE — 7100000011 HC PHASE II RECOVERY - ADDTL 15 MIN: Performed by: STUDENT IN AN ORGANIZED HEALTH CARE EDUCATION/TRAINING PROGRAM

## 2024-04-19 PROCEDURE — 6360000004 HC RX CONTRAST MEDICATION: Performed by: STUDENT IN AN ORGANIZED HEALTH CARE EDUCATION/TRAINING PROGRAM

## 2024-04-19 PROCEDURE — 3600000012 HC SURGERY LEVEL 2 ADDTL 15MIN: Performed by: STUDENT IN AN ORGANIZED HEALTH CARE EDUCATION/TRAINING PROGRAM

## 2024-04-19 PROCEDURE — 64484 NJX AA&/STRD TFRM EPI L/S EA: CPT | Performed by: STUDENT IN AN ORGANIZED HEALTH CARE EDUCATION/TRAINING PROGRAM

## 2024-04-19 PROCEDURE — 3600000002 HC SURGERY LEVEL 2 BASE: Performed by: STUDENT IN AN ORGANIZED HEALTH CARE EDUCATION/TRAINING PROGRAM

## 2024-04-19 PROCEDURE — 2500000003 HC RX 250 WO HCPCS: Performed by: STUDENT IN AN ORGANIZED HEALTH CARE EDUCATION/TRAINING PROGRAM

## 2024-04-19 PROCEDURE — 6360000002 HC RX W HCPCS: Performed by: STUDENT IN AN ORGANIZED HEALTH CARE EDUCATION/TRAINING PROGRAM

## 2024-04-19 RX ORDER — SODIUM CHLORIDE 0.9 % (FLUSH) 0.9 %
5-40 SYRINGE (ML) INJECTION PRN
Status: DISCONTINUED | OUTPATIENT
Start: 2024-04-19 | End: 2024-04-19 | Stop reason: HOSPADM

## 2024-04-19 RX ORDER — SODIUM CHLORIDE 0.9 % (FLUSH) 0.9 %
5-40 SYRINGE (ML) INJECTION EVERY 12 HOURS SCHEDULED
Status: DISCONTINUED | OUTPATIENT
Start: 2024-04-19 | End: 2024-04-19 | Stop reason: HOSPADM

## 2024-04-19 RX ORDER — IOPAMIDOL 612 MG/ML
INJECTION, SOLUTION INTRATHECAL PRN
Status: DISCONTINUED | OUTPATIENT
Start: 2024-04-19 | End: 2024-04-19 | Stop reason: ALTCHOICE

## 2024-04-19 RX ORDER — DEXAMETHASONE SODIUM PHOSPHATE 10 MG/ML
INJECTION, SOLUTION INTRAMUSCULAR; INTRAVENOUS PRN
Status: DISCONTINUED | OUTPATIENT
Start: 2024-04-19 | End: 2024-04-19 | Stop reason: ALTCHOICE

## 2024-04-19 RX ORDER — LIDOCAINE HYDROCHLORIDE 5 MG/ML
INJECTION, SOLUTION INFILTRATION; INTRAVENOUS PRN
Status: DISCONTINUED | OUTPATIENT
Start: 2024-04-19 | End: 2024-04-19 | Stop reason: ALTCHOICE

## 2024-04-19 RX ORDER — SODIUM CHLORIDE 9 MG/ML
INJECTION, SOLUTION INTRAVENOUS PRN
Status: DISCONTINUED | OUTPATIENT
Start: 2024-04-19 | End: 2024-04-19 | Stop reason: HOSPADM

## 2024-04-19 NOTE — H&P
[facility]  Pain Medicine  [address] [place] [address]    Procedure History & Physical      Neftaly Finch     HPI:    Patient  is here for  RLE Pain pain for Right L3 and L5 TFESI    Labs/imaging studies reviewed   All question and concerns addressed including R/B/A associated with the procedure    Past Medical History:   Diagnosis Date    Back pain     due to foot problems hallux valgus  plantar fasciitis    DeQuervain's disease (tenosynovitis) 03/21/2011    LEFT       Past Surgical History:   Procedure Laterality Date    CARPAL TUNNEL RELEASE      CYST REMOVAL  2007    Spermcidal    FINGER FRACTURE SURGERY  1990    HEMORRHOID SURGERY  02/20/2015    HEMORRHOID SURGERY N/A 08/06/2021    HEMORRHOIDECTOMY performed by Patrick Reyes MD at [email or web address]] OR    HEMORRHOID SURGERY N/A 09/10/2021    EXAM UNDER ANESTHESIA, HEMORRHOIDECTOMY (CPT 07489) performed by Patrick Reyes MD at [facility] [[email or web address] OR    ULNAR TUNNEL RELEASE      VASECTOMY  2007       Prior to Admission medications    Medication Sig Start Date End Date Taking? Authorizing Provider   meloxicam (MOBIC) 15 MG tablet take 1 tablet by mouth once daily 11/9/23   Arnold Valadez MD   traZODone (DESYREL) 100 MG tablet Take 1 tablet by mouth nightly  Patient taking differently: Take 1 tablet by mouth nightly as needed 3/4/22   Aga Salgado, APRN - CNP       No Known Allergies    Social History     Socioeconomic History    Marital status: Single     Spouse name: Not on file    Number of children: Not on file    Years of education: Not on file    Highest education level: Not on file   Occupational History    Not on file   Tobacco Use    Smoking status: Light Smoker     Current packs/day: 0.25     Average packs/day: 0.3 packs/day for 12.0 years (3.0 ttl pk-yrs)     Types: Cigarettes, Cigars    Smokeless tobacco: Never   Vaping Use    Vaping Use: Never used   Substance and Sexual Activity    Alcohol use: Yes     Alcohol/week:

## 2024-04-19 NOTE — DISCHARGE INSTRUCTIONS
[facility] Pain Management Department  [place]-[phone]  Dr. Lary Davidson   Post-Pain Block/Radiofrequency  Home Going Instructions    1-Go home, rest for the remainder of the day  2-Please do not lift over 20 pounds the day of the injection  3-If you received sedation No: alcohol, driving, operating lawn mowers, plows, tractors or other dangerous equipment until next morning. Do not make important decisions or sign legal documents for 24 hours. You may experience light headedness, dizziness, nausea or sleepiness after sedation. Do not stay alone. A responsible adult must be with you for 24 hours. You could be nauseated from the medications you have received. Your IV site may be sore and bruised.    4-No dietary restrictions     5-Resume all medications the same day, blood thinners to be resumed 24 hours after injection if you were instructed to stop any.    6-Keep the surgical site clean and dry, you may shower the next morning and remove the      dressing.     7- No sitz baths, tub baths or hot tubs/swimming for 24 hours.       8- If you have any pain at the injection site(s), application of an ice pack to the area should be       helpful, 20 minutes on/20 minutes off for next 48 hours.  9- Call [facility] immediately at if you develop.  Fever greater than 100.4 F  Have bleeding or drainage from the puncture site  Have progressive Leg/arm numbness and or weakness  Loss of control of bowel and or bladder (wet/soil yourself)  Severe headache with inability to lift head  10-You may return to work the next day

## 2024-04-19 NOTE — OP NOTE
2024    Patient: Neftaly Finch  :  1975  Age:  49 y.o.  Sex:  male     PRE-OPERATIVE DIAGNOSIS: Lumbar disc displacement, lumbar neural foraminal stenosis, lumbar radiculopathy.     POST-OPERATIVE DIAGNOSIS: Same.    PROCEDURE: Right Transforaminal epidural steroid injection under fluoroscopic guidance at foraminal level L3, and L5 (#1).    SURGEON:  Malika Mcnally MD    ANESTHESIA: Local    ESTIMATED BLOOD LOSS: None.  ______________________________________________________________________  BRIEF HISTORY: Neftaly Finch comes in today for the first Right transforaminal epidural steroid injection under fluoroscopic guidance at foraminal level L3 and L5 . The potential complications of this procedure were discussed with him again today.  He has elected to undergo the aforementioned procedure.     Neftaly’s complete History & Physical examination were reviewed in depth, a copy of which is in the chart.      DESCRIPTION OF PROCEDURE:    After confirming written and informed consent, a time-out was performed and Neftaly’s name and date of birth, the procedure to be performed as well as the plan for the location of the needle insertion were confirmed.    The patient was brought into the procedure room and placed in the prone position on the fluoroscopy table. Standard monitors were placed and vital signs were observed throughout the procedure. The area of the lumbar spine was prepped with chloraprep and draped in a sterile manner. The vertebral body was identified with AP fluoroscopy. An oblique view was obtained to better visualize the inferior junction of the pedicle and transverse process . The 6 o'clock position of the right L3 and L5 pedicle(s) was marked and identified. The skin and subcutaneous tissue were anesthetized with 0.5% lidocaine. A # 22 gauge pencil point needle was directed toward the targeted point under fluoroscopy until bone was contacted. The needle was then walked inferiorly

## 2024-05-15 ENCOUNTER — OFFICE VISIT (OUTPATIENT)
Age: 49
End: 2024-05-15
Payer: COMMERCIAL

## 2024-05-15 VITALS
BODY MASS INDEX: 22.59 KG/M2 | SYSTOLIC BLOOD PRESSURE: 126 MMHG | HEART RATE: 99 BPM | WEIGHT: 176 LBS | RESPIRATION RATE: 16 BRPM | HEIGHT: 74 IN | TEMPERATURE: 97.2 F | DIASTOLIC BLOOD PRESSURE: 87 MMHG | OXYGEN SATURATION: 95 %

## 2024-05-15 DIAGNOSIS — G57.11 MERALGIA PARESTHETICA OF RIGHT SIDE: ICD-10-CM

## 2024-05-15 DIAGNOSIS — M54.41 CHRONIC BILATERAL LOW BACK PAIN WITH RIGHT-SIDED SCIATICA: ICD-10-CM

## 2024-05-15 DIAGNOSIS — M51.36 DDD (DEGENERATIVE DISC DISEASE), LUMBAR: ICD-10-CM

## 2024-05-15 DIAGNOSIS — G89.29 CHRONIC BILATERAL LOW BACK PAIN WITH RIGHT-SIDED SCIATICA: ICD-10-CM

## 2024-05-15 DIAGNOSIS — M54.16 LUMBAR RADICULOPATHY: Primary | ICD-10-CM

## 2024-05-15 PROCEDURE — 99213 OFFICE O/P EST LOW 20 MIN: CPT | Performed by: STUDENT IN AN ORGANIZED HEALTH CARE EDUCATION/TRAINING PROGRAM

## 2024-05-15 NOTE — PROGRESS NOTES
Neftaly Finch presents to the [facility] on 5/15/2024. Nfetaly is complaining of pain low back . The pain is intermittent. The pain is described as numb. Pain is rated on his best day at a 1, on his worst day at a 7, and on average at a 3 on the VAS scale. He took his last dose of Mobic .    Any procedures since your last visit: Yes, Procedure: RIGHT LUMBAR 3 AND LUMBAR 5 TRANSFORAMINAL EPIDURAL STEROID INJECTION UNDER FLUOROSCOPIC GUIDANCE  , Date: 4/19/24   He is not on NSAIDS and  is not on anticoagulation medications   Pacemaker or defibrillator: No       Medication Contract and Consent for Opioid Use Documents Filed        No documents found                       /87   Pulse 99   Temp 97.2 °F (36.2 °C) (Temporal)   Resp 16   Ht 1.88 m (6' 2\")   Wt 79.8 kg (176 lb)   SpO2 95%   BMI 22.60 kg/m²      No LMP for male patient.

## 2024-05-15 NOTE — PROGRESS NOTES
[facility] - Pain Medicine  [address] [place] [address]     Pain Medicine Follow Up Note      Neftaly Finch     Date of Visit:  5/15/2024    CC:  Patient presents for follow up   Chief Complaint   Patient presents with    Follow-up     RIGHT LUMBAR 3 AND LUMBAR 5 TRANSFORAMINAL EPIDURAL STEROID INJECTION UNDER FLUOROSCOPIC GUIDANCE       HPI:    Pain is better.  Medication side effects:none.   Recent interventional procedures:L3, L5 TFESI.good.  Blood Thinners/Anticoagulation:  no  Herbal Supplements: no  Pertinent Allergies: no  Diabetic: no  Bowel/Bladder Incontinence: no    Previous Plan:  TFESI - done  HEP - ongoing     Interval Changes:  Right L3, L5 TFESI good relief after     Procedures:    4/19/24 - Right L3 and L5 TFESI - 60% for 2 weeks     Previous Treatments:   Mobic, ibuprofen, trazodone, right hip injection.     Potential Aberrant Drug-Related Behavior:  no      Imaging/Studies: New: no     XRAY:  XR Lumbar 10/2023  IMPRESSION:  Minimal degenerative changes seen in the L5/S1 level.  No significant neural  foraminal narrowing identified with no evidence of acute fracture or  traumatic malalignment.     XR HIP 2-3 VW W PELVIS RIGHT 01/26/2024  The hip demonstrates normal alignment. No evidence of acute fracture.  No focal osseus lesion.   Pelvis is intact.     Impression  No acute abnormality of the hip.        XR LUMBAR SPINE (MIN 4 VIEWS) 10/26/2023  Degenerative changes identified most pronounced at the L5/S1 level.  There is anterior spurring identified.  Some disc space narrowing.  Facets are well aligned. No significant neural foraminal narrowing is identified.     Impression  Minimal degenerative changes seen in the L5/S1 level.  No significant neural foraminal narrowing identified with no evidence of acute fracture or traumatic malalignment.    MRI:  MRI LUMBAR SPINE WO CONTRAST 02/14/2024    Narrative  EXAMINATION:  MRI OF THE LUMBAR SPINE WITHOUT CONTRAST, 2/14/2024

## 2024-06-13 DIAGNOSIS — F51.01 PRIMARY INSOMNIA: ICD-10-CM

## 2024-06-13 RX ORDER — TRAZODONE HYDROCHLORIDE 100 MG/1
100 TABLET ORAL NIGHTLY
Qty: 90 TABLET | Refills: 1 | Status: SHIPPED | OUTPATIENT
Start: 2024-06-13

## 2024-06-13 NOTE — TELEPHONE ENCOUNTER
Last Appointment:  7/29/2022  Future Appointments   Date Time Provider Department Center   6/18/2024  3:00 PM Malika Mcnally MD [facility]

## 2024-06-15 ENCOUNTER — HOSPITAL ENCOUNTER (EMERGENCY)
Age: 49
Discharge: HOME | End: 2024-06-15

## 2024-06-15 VITALS
BODY MASS INDEX: 22.6 KG/M2 | RESPIRATION RATE: 18 BRPM | DIASTOLIC BLOOD PRESSURE: 79 MMHG | TEMPERATURE: 98.6 F | HEART RATE: 59 BPM | WEIGHT: 176 LBS | SYSTOLIC BLOOD PRESSURE: 120 MMHG | OXYGEN SATURATION: 99 %

## 2024-06-15 DIAGNOSIS — N48.89 PENILE PAIN: Primary | ICD-10-CM

## 2024-06-15 LAB
BILIRUB UR QL STRIP: NEGATIVE
CLARITY UR: CLEAR
COLOR UR: YELLOW
GLUCOSE UR STRIP-MCNC: NEGATIVE MG/DL
HGB UR QL STRIP.AUTO: NEGATIVE
KETONES UR STRIP-MCNC: NEGATIVE MG/DL
LEUKOCYTE ESTERASE UR QL STRIP: NEGATIVE
NITRITE UR QL STRIP: NEGATIVE
PH UR STRIP: 6 [PH] (ref 5–9)
PROT UR STRIP-MCNC: NEGATIVE MG/DL
RBC #/AREA URNS HPF: NORMAL /HPF
SP GR UR STRIP: 1.01 (ref 1–1.03)
UROBILINOGEN UR STRIP-ACNC: 0.2 EU/DL (ref 0–1)
WBC #/AREA URNS HPF: NORMAL /HPF

## 2024-06-15 PROCEDURE — 87491 CHLMYD TRACH DNA AMP PROBE: CPT

## 2024-06-15 PROCEDURE — 99211 OFF/OP EST MAY X REQ PHY/QHP: CPT

## 2024-06-15 PROCEDURE — 87591 N.GONORRHOEAE DNA AMP PROB: CPT

## 2024-06-15 PROCEDURE — 81001 URINALYSIS AUTO W/SCOPE: CPT

## 2024-06-15 ASSESSMENT — PAIN - FUNCTIONAL ASSESSMENT: PAIN_FUNCTIONAL_ASSESSMENT: NONE - DENIES PAIN

## 2024-06-15 NOTE — ED PROVIDER NOTES
Independent TAMMIE Visit.     [facility]  Department of Emergency Medicine   ED  Encounter Note  Admit Date/RoomTime: 6/15/2024  5:34 PM  ED Room:   NAME: Neftaly Finch  : 1975  MRN: [id number]     Chief Complaint:  Urinary Tract Infection (Penis pain, no discharge or burning, has had this 6 other times, increased his cranberry juice but is still having urinary discomfort, thinks its from dehydration or early prostate problem maybe)    HISTORY OF PRESENT ILLNESS        Neftaly Finch is a 49 y.o. male who presents to the ED with a complaint of a pinching pain in his penis.  Patient wants to be checked for regular bacterial infection in his urine but also for an STD.  Patient states for couple weeks now he just gets this random pinching pain in his penis.  He denies any fevers or chills.  Denies burning with urination.  Denies penile discharge.  Denies any scrotal pain or scrotal swelling.  There is no sores or lesions to his genital area.  Symptoms are mild in severity and intermittent.      ROS   Pertinent positives and negatives are stated within HPI, all other systems reviewed and are negative.    Past Medical History:  has a past medical history of Back pain and DeQuervain's disease (tenosynovitis).    Surgical History:  has a past surgical history that includes Vasectomy (); Finger fracture surgery (); Carpal tunnel release; Ulnar tunnel release; Hemorrhoid surgery (2015); cyst removal (); Hemorrhoid surgery (N/A, 2021); Hemorrhoid surgery (N/A, 09/10/2021); and Pain management procedure (Right, 2024).    Social History:  reports that he has been smoking cigarettes and cigars. He has a 3.0 pack-year smoking history. He has never used smokeless tobacco. He reports current alcohol use of about 2.0 standard drinks of alcohol per week. He reports current drug use. Drug: Marijuana (Weed).    Family History: family history is not on file.     Allergies:

## 2024-06-18 ENCOUNTER — OFFICE VISIT (OUTPATIENT)
Dept: PAIN MANAGEMENT | Age: 49
End: 2024-06-18

## 2024-06-18 VITALS
HEART RATE: 67 BPM | OXYGEN SATURATION: 95 % | DIASTOLIC BLOOD PRESSURE: 67 MMHG | RESPIRATION RATE: 16 BRPM | WEIGHT: 176 LBS | TEMPERATURE: 98.2 F | HEIGHT: 74 IN | SYSTOLIC BLOOD PRESSURE: 120 MMHG | BODY MASS INDEX: 22.59 KG/M2

## 2024-06-18 DIAGNOSIS — M54.41 CHRONIC BILATERAL LOW BACK PAIN WITH RIGHT-SIDED SCIATICA: ICD-10-CM

## 2024-06-18 DIAGNOSIS — M51.36 DDD (DEGENERATIVE DISC DISEASE), LUMBAR: ICD-10-CM

## 2024-06-18 DIAGNOSIS — G89.29 CHRONIC BILATERAL LOW BACK PAIN WITH RIGHT-SIDED SCIATICA: ICD-10-CM

## 2024-06-18 DIAGNOSIS — G57.11 MERALGIA PARESTHETICA OF RIGHT SIDE: ICD-10-CM

## 2024-06-18 DIAGNOSIS — M54.16 LUMBAR RADICULOPATHY: Primary | ICD-10-CM

## 2024-06-18 LAB
CHLAMYDIA DNA UR QL NAA+PROBE: NEGATIVE
N GONORRHOEA DNA UR QL NAA+PROBE: NEGATIVE
SPECIMEN DESCRIPTION: NORMAL

## 2024-06-18 PROCEDURE — 99213 OFFICE O/P EST LOW 20 MIN: CPT | Performed by: STUDENT IN AN ORGANIZED HEALTH CARE EDUCATION/TRAINING PROGRAM

## 2024-06-18 PROCEDURE — 99214 OFFICE O/P EST MOD 30 MIN: CPT

## 2024-06-18 NOTE — PROGRESS NOTES
Neftaly GANT Stef presents to the [facility] on 6/18/2024. Neftaly is complaining of pain low back . The pain is constant. The pain is described as aching. Pain is rated on his best day at a 3, on his worst day at a 9, and on average at a 5 on the VAS scale. He took his last dose of Mobic .    Any procedures since your last visit: No,   He is not on NSAIDS and  is not on anticoagulation medications   Pacemaker or defibrillator: No       Medication Contract and Consent for Opioid Use Documents Filed        No documents found                       Temp 98.2 °F (36.8 °C) (Temporal)   Resp 16   Ht 1.88 m (6' 2\")   Wt 79.8 kg (176 lb)   SpO2 95%   BMI 22.60 kg/m²      No LMP for male patient.  
CONTRAST  INDICATION:  headache, family history of aneurysms  headache, family history of aneurysms  COMPARISON: CT head without contrast, March 15, 2016    FINDINGS:    PARENCHYMA:No mass effect, edema or hemorrhage.The brain is normal in  volume. No significant chronic microvascular ischemic changes  appreciated. Stable asymmetric physiologic calcification is seen in  the basal ganglia, left greater than right.    VENTRICLES:No hydrocephalus.    CALVARIUM:The calvarium is intact without fracture or focal lesion.    SINUSES:The visualized paranasal sinuses and mastoids are clear.    Impression  No acute intracranial abnormality.      EMG:    Pertinent Labs:   Lab Results   Component Value Date/Time    BUN 14 06/14/2023 12:41 PM    CREATININE 1.1 06/14/2023 12:41 PM    GLUCOSE 84 06/14/2023 12:41 PM    AST 27 06/14/2023 12:41 PM    ALT 18 06/14/2023 12:41 PM     06/14/2023 12:41 PM       Last Drug Screening (if applicable):    Barbiturate Screen, Ur (ng/mL)   Date Value   01/24/2013 NOT DETECTED     Benzodiazepine Screen, Urine (ng/mL)   Date Value   01/24/2013 NOT DETECTED     Methadone Screen, Urine (ng/mL)   Date Value   01/24/2013 POSITIVE       Past Medical History:   Diagnosis Date    Back pain     due to foot problems hallux valgus  plantar fasciitis    DeQuervain's disease (tenosynovitis) 03/21/2011    LEFT       Past Surgical History:   Procedure Laterality Date    CARPAL TUNNEL RELEASE      CYST REMOVAL  2007    Spermcidal    FINGER FRACTURE SURGERY  1990    HEMORRHOID SURGERY  02/20/2015    HEMORRHOID SURGERY N/A 08/06/2021    HEMORRHOIDECTOMY performed by Patrick Reyes MD at [facility] OR    HEMORRHOID SURGERY N/A 09/10/2021    EXAM UNDER ANESTHESIA, HEMORRHOIDECTOMY (CPT 73151) performed by Patrick Reyes MD at [facility] OR    PAIN MANAGEMENT PROCEDURE Right 4/19/2024    RIGHT LUMBAR 3 AND LUMBAR 5 TRANSFORAMINAL EPIDURAL STEROID INJECTION UNDER FLUOROSCOPIC GUIDANCE performed by Malika Mcnally MD

## 2024-06-20 ENCOUNTER — OFFICE VISIT (OUTPATIENT)
Dept: PRIMARY CARE CLINIC | Age: 49
End: 2024-06-20

## 2024-06-20 VITALS
WEIGHT: 165.2 LBS | SYSTOLIC BLOOD PRESSURE: 121 MMHG | DIASTOLIC BLOOD PRESSURE: 78 MMHG | OXYGEN SATURATION: 98 % | HEIGHT: 74 IN | TEMPERATURE: 97.9 F | HEART RATE: 50 BPM | BODY MASS INDEX: 21.2 KG/M2

## 2024-06-20 DIAGNOSIS — M79.651 PAIN OF RIGHT THIGH: ICD-10-CM

## 2024-06-20 DIAGNOSIS — N36.8 URETHRAL IRRITATION: Primary | ICD-10-CM

## 2024-06-20 DIAGNOSIS — M25.551 PAIN OF RIGHT HIP: ICD-10-CM

## 2024-06-20 LAB
BILIRUBIN, POC: NEGATIVE
BLOOD URINE, POC: NEGATIVE
CLARITY, POC: CLEAR
COLOR, POC: YELLOW
GLUCOSE URINE, POC: NEGATIVE
KETONES, POC: NEGATIVE
LEUKOCYTE EST, POC: NEGATIVE
NITRITE, POC: NEGATIVE
PH, POC: 5.5
PROTEIN, POC: NEGATIVE
SPECIFIC GRAVITY, POC: >=1.03
UROBILINOGEN, POC: 0.2

## 2024-06-20 PROCEDURE — 99214 OFFICE O/P EST MOD 30 MIN: CPT | Performed by: FAMILY MEDICINE

## 2024-06-20 PROCEDURE — 81002 URINALYSIS NONAUTO W/O SCOPE: CPT | Performed by: FAMILY MEDICINE

## 2024-06-20 PROCEDURE — G2211 COMPLEX E/M VISIT ADD ON: HCPCS | Performed by: FAMILY MEDICINE

## 2024-06-20 SDOH — ECONOMIC STABILITY: HOUSING INSECURITY
IN THE LAST 12 MONTHS, WAS THERE A TIME WHEN YOU DID NOT HAVE A STEADY PLACE TO SLEEP OR SLEPT IN A SHELTER (INCLUDING NOW)?: NO

## 2024-06-20 SDOH — ECONOMIC STABILITY: INCOME INSECURITY: HOW HARD IS IT FOR YOU TO PAY FOR THE VERY BASICS LIKE FOOD, HOUSING, MEDICAL CARE, AND HEATING?: NOT HARD AT ALL

## 2024-06-20 SDOH — ECONOMIC STABILITY: FOOD INSECURITY: WITHIN THE PAST 12 MONTHS, YOU WORRIED THAT YOUR FOOD WOULD RUN OUT BEFORE YOU GOT MONEY TO BUY MORE.: NEVER TRUE

## 2024-06-20 SDOH — ECONOMIC STABILITY: FOOD INSECURITY: WITHIN THE PAST 12 MONTHS, THE FOOD YOU BOUGHT JUST DIDN'T LAST AND YOU DIDN'T HAVE MONEY TO GET MORE.: NEVER TRUE

## 2024-06-20 ASSESSMENT — PATIENT HEALTH QUESTIONNAIRE - PHQ9
SUM OF ALL RESPONSES TO PHQ QUESTIONS 1-9: 0
2. FEELING DOWN, DEPRESSED OR HOPELESS: NOT AT ALL
1. LITTLE INTEREST OR PLEASURE IN DOING THINGS: NOT AT ALL
SUM OF ALL RESPONSES TO PHQ QUESTIONS 1-9: 0
SUM OF ALL RESPONSES TO PHQ9 QUESTIONS 1 & 2: 0

## 2024-06-20 NOTE — PROGRESS NOTES
Neftaly Finch  : 1975    Chief Complaint:     Chief Complaint   Patient presents with    Penis Pain     Reoccurring issue. Pt states feels like a pinch, no discharge. Pt declines tdap at this time.     HPI  Symptoms of popping and pain with coughing and sneezing returned in the right inguinal region, but too lateral for a hernia  Some radiation of symptoms down the leg to the knee, appears more tendon issue  ED visit for penile pain with UTI workup as well as STD workup  This has been happening intermittently for years  Notes intermittent urethral irritation without discharge or skin changes/rash. Some irritation with urination, but not consistent. Has had some changes in soaps, fabrics recently. Also, after review, feels one female partner always causes him irritation after intercourse.     Past medical, surgical, family and social histories reviewed and updated today as appropriate    Health Maintenance Due   Topic Date Due    Hepatitis B vaccine (1 of 3 - 3-dose series) Never done    COVID-19 Vaccine (1) Never done    Pneumococcal 0-64 years Vaccine (1 of 2 - PCV) Never done       Current Outpatient Medications   Medication Sig Dispense Refill    meloxicam (MOBIC) 15 MG tablet take 1 tablet by mouth once daily 30 tablet 5    traZODone (DESYREL) 100 MG tablet Take 1 tablet by mouth nightly (Patient taking differently: Take 1 tablet by mouth nightly as needed) 90 tablet 1     No current facility-administered medications for this visit.       No Known Allergies      REVIEW OF SYSTEMS  Review of Systems   Constitutional:  Negative for activity change, appetite change, chills, diaphoresis, fatigue, fever and unexpected weight change.   Respiratory: Negative.     Cardiovascular: Negative.    Gastrointestinal:  Negative for abdominal distention, abdominal pain, anal bleeding, blood in stool, constipation, diarrhea, nausea and vomiting.   Genitourinary:  Negative for decreased urine volume, difficulty

## 2024-06-21 LAB
CULTURE: NO GROWTH
SPECIMEN DESCRIPTION: NORMAL

## 2024-06-25 LAB
CULTURE: NORMAL
SPECIMEN DESCRIPTION: NORMAL

## 2024-07-03 PROBLEM — M79.651 PAIN OF RIGHT THIGH: Status: ACTIVE | Noted: 2024-07-03

## 2024-07-03 PROBLEM — N36.8 URETHRAL IRRITATION: Status: ACTIVE | Noted: 2024-07-03

## 2024-07-03 ASSESSMENT — ENCOUNTER SYMPTOMS
BLOOD IN STOOL: 0
DIARRHEA: 0
NAUSEA: 0
CONSTIPATION: 0
VOMITING: 0
RESPIRATORY NEGATIVE: 1
ABDOMINAL DISTENTION: 0
ABDOMINAL PAIN: 0
ANAL BLEEDING: 0

## 2024-08-29 DIAGNOSIS — G89.29 CHRONIC BILATERAL LOW BACK PAIN WITHOUT SCIATICA: ICD-10-CM

## 2024-08-29 DIAGNOSIS — M54.50 CHRONIC BILATERAL LOW BACK PAIN WITHOUT SCIATICA: ICD-10-CM

## 2024-08-29 RX ORDER — MELOXICAM 15 MG/1
TABLET ORAL
Qty: 90 TABLET | Refills: 1 | Status: SHIPPED | OUTPATIENT
Start: 2024-08-29

## 2024-10-02 ENCOUNTER — OFFICE VISIT (OUTPATIENT)
Dept: PRIMARY CARE CLINIC | Age: 49
End: 2024-10-02
Payer: COMMERCIAL

## 2024-10-02 VITALS
WEIGHT: 161.8 LBS | HEART RATE: 54 BPM | TEMPERATURE: 97.3 F | RESPIRATION RATE: 16 BRPM | DIASTOLIC BLOOD PRESSURE: 80 MMHG | HEIGHT: 74 IN | SYSTOLIC BLOOD PRESSURE: 123 MMHG | BODY MASS INDEX: 20.76 KG/M2 | OXYGEN SATURATION: 100 %

## 2024-10-02 DIAGNOSIS — Z20.2 POTENTIAL EXPOSURE TO STD: ICD-10-CM

## 2024-10-02 DIAGNOSIS — N36.8 URETHRAL IRRITATION: Primary | ICD-10-CM

## 2024-10-02 LAB
BILIRUBIN, POC: NEGATIVE
BLOOD URINE, POC: NEGATIVE
CLARITY, POC: CLEAR
COLOR, POC: YELLOW
GLUCOSE URINE, POC: NEGATIVE MG/DL
KETONES, POC: NEGATIVE MG/DL
LEUKOCYTE EST, POC: NEGATIVE
NITRITE, POC: NEGATIVE
PH, POC: 6
PROTEIN, POC: NEGATIVE MG/DL
SPECIFIC GRAVITY, POC: 1.02
UROBILINOGEN, POC: 0.2 MG/DL

## 2024-10-02 PROCEDURE — 81002 URINALYSIS NONAUTO W/O SCOPE: CPT

## 2024-10-02 PROCEDURE — 99214 OFFICE O/P EST MOD 30 MIN: CPT

## 2024-10-02 PROCEDURE — 96372 THER/PROPH/DIAG INJ SC/IM: CPT

## 2024-10-02 RX ORDER — CEFTRIAXONE 500 MG/1
500 INJECTION, POWDER, FOR SOLUTION INTRAMUSCULAR; INTRAVENOUS ONCE
Status: COMPLETED | OUTPATIENT
Start: 2024-10-02 | End: 2024-10-02

## 2024-10-02 RX ORDER — DOXYCYCLINE 100 MG/1
100 CAPSULE ORAL 2 TIMES DAILY
Qty: 14 CAPSULE | Refills: 0 | Status: SHIPPED | OUTPATIENT
Start: 2024-10-02 | End: 2024-10-09

## 2024-10-02 RX ADMIN — CEFTRIAXONE 500 MG: 500 INJECTION, POWDER, FOR SOLUTION INTRAMUSCULAR; INTRAVENOUS at 12:19

## 2024-10-02 NOTE — PROGRESS NOTES
about 2.0 standard drinks of alcohol per week. He reports current drug use. Drug: Marijuana (Weed).  Family History: family history is not on file.   Allergies: Patient has no known allergies.    Physical Exam         VS:  /80 (Site: Right Upper Arm, Position: Sitting, Cuff Size: Medium Adult)   Pulse 54   Temp 97.3 °F (36.3 °C) (Temporal)   Resp 16   Ht 1.88 m (6' 2\")   Wt 73.4 kg (161 lb 12.8 oz)   SpO2 100%   BMI 20.77 kg/m²    Oxygen Saturation Interpretation: Normal.    Constitutional:  Alert, development consistent with age.  Neck:  Normal ROM.  Supple.  Lungs:  Clear to auscultation and breath sounds equal.  Heart:  Regular rate and rhythm, normal heart sounds, without pathological murmurs, ectopy, gallops, or rubs.  Abdomen:  Soft, nontender, good bowel sounds.  No firm or pulsatile mass.  Genitalia Exam: deferred   Skin:  Normal turgor.  Warm, dry, without visible rash, unless noted elsewhere.  Neurological:  Orientation age-appropriate.  Motor functions intact.    Lab / Imaging Results   (All laboratory and radiology results have been personally reviewed by myself)  Labs:  Results for orders placed or performed in visit on 10/02/24   POCT Urinalysis no Micro   Result Value Ref Range    Color, UA Yellow     Clarity, UA Clear     Glucose, UA POC Negative mg/dL    Bilirubin, UA Negative     Ketones, UA Negative mg/dL    Spec Grav, UA 1.020     Blood, UA POC Negative     pH, UA 6.0     Protein, UA POC Negative mg/dL    Urobilinogen, UA 0.2 mg/dL    Leukocytes, UA Negative     Nitrite, UA Negative          Assessment / Plan     Impression(s):  Neftaly was seen today for other.    Diagnoses and all orders for this visit:    Urethral irritation  -     POCT Urinalysis no Micro  -     Culture, Urine  -     Trichomonas Screen  -     C.trachomatis N.gonorrhoeae DNA, Urine  -     cefTRIAXone (ROCEPHIN) injection 500 mg  -     doxycycline hyclate (VIBRAMYCIN) 100 MG capsule; Take 1 capsule by mouth 2 times

## 2024-10-03 LAB
CULTURE: NO GROWTH
SPECIMEN DESCRIPTION: NORMAL

## 2024-10-04 LAB
C. TRACHOMATIS DNA ,URINE: NEGATIVE
CULTURE: NORMAL
N. GONORRHOEAE DNA, URINE: NEGATIVE
SPECIMEN DESCRIPTION: NORMAL

## 2024-10-07 LAB
CULTURE: NORMAL
SPECIMEN DESCRIPTION: NORMAL

## 2024-12-13 DIAGNOSIS — M54.50 CHRONIC BILATERAL LOW BACK PAIN WITHOUT SCIATICA: ICD-10-CM

## 2024-12-13 DIAGNOSIS — G89.29 CHRONIC BILATERAL LOW BACK PAIN WITHOUT SCIATICA: ICD-10-CM

## 2024-12-16 RX ORDER — MELOXICAM 15 MG/1
15 TABLET ORAL DAILY
Qty: 90 TABLET | Refills: 1 | Status: SHIPPED | OUTPATIENT
Start: 2024-12-16

## 2024-12-26 DIAGNOSIS — F51.01 PRIMARY INSOMNIA: ICD-10-CM

## 2024-12-26 DIAGNOSIS — M54.50 CHRONIC BILATERAL LOW BACK PAIN WITHOUT SCIATICA: ICD-10-CM

## 2024-12-26 DIAGNOSIS — G89.29 CHRONIC BILATERAL LOW BACK PAIN WITHOUT SCIATICA: ICD-10-CM

## 2025-01-13 SDOH — ECONOMIC STABILITY: TRANSPORTATION INSECURITY
IN THE PAST 12 MONTHS, HAS THE LACK OF TRANSPORTATION KEPT YOU FROM MEDICAL APPOINTMENTS OR FROM GETTING MEDICATIONS?: NO

## 2025-01-13 SDOH — ECONOMIC STABILITY: TRANSPORTATION INSECURITY
IN THE PAST 12 MONTHS, HAS LACK OF TRANSPORTATION KEPT YOU FROM MEETINGS, WORK, OR FROM GETTING THINGS NEEDED FOR DAILY LIVING?: NO

## 2025-01-13 SDOH — ECONOMIC STABILITY: FOOD INSECURITY: WITHIN THE PAST 12 MONTHS, YOU WORRIED THAT YOUR FOOD WOULD RUN OUT BEFORE YOU GOT MONEY TO BUY MORE.: OFTEN TRUE

## 2025-01-13 SDOH — ECONOMIC STABILITY: FOOD INSECURITY: WITHIN THE PAST 12 MONTHS, THE FOOD YOU BOUGHT JUST DIDN'T LAST AND YOU DIDN'T HAVE MONEY TO GET MORE.: OFTEN TRUE

## 2025-01-13 SDOH — ECONOMIC STABILITY: INCOME INSECURITY: IN THE LAST 12 MONTHS, WAS THERE A TIME WHEN YOU WERE NOT ABLE TO PAY THE MORTGAGE OR RENT ON TIME?: YES

## 2025-01-13 ASSESSMENT — PATIENT HEALTH QUESTIONNAIRE - PHQ9
2. FEELING DOWN, DEPRESSED OR HOPELESS: NOT AT ALL
2. FEELING DOWN, DEPRESSED OR HOPELESS: NOT AT ALL
SUM OF ALL RESPONSES TO PHQ QUESTIONS 1-9: 0
SUM OF ALL RESPONSES TO PHQ QUESTIONS 1-9: 0
SUM OF ALL RESPONSES TO PHQ9 QUESTIONS 1 & 2: 0
1. LITTLE INTEREST OR PLEASURE IN DOING THINGS: NOT AT ALL
SUM OF ALL RESPONSES TO PHQ QUESTIONS 1-9: 0
1. LITTLE INTEREST OR PLEASURE IN DOING THINGS: NOT AT ALL
SUM OF ALL RESPONSES TO PHQ9 QUESTIONS 1 & 2: 0
SUM OF ALL RESPONSES TO PHQ QUESTIONS 1-9: 0

## 2025-01-14 ENCOUNTER — OFFICE VISIT (OUTPATIENT)
Dept: PRIMARY CARE CLINIC | Age: 50
End: 2025-01-14
Payer: COMMERCIAL

## 2025-01-14 VITALS
HEIGHT: 74 IN | HEART RATE: 82 BPM | TEMPERATURE: 98.2 F | OXYGEN SATURATION: 98 % | BODY MASS INDEX: 21.23 KG/M2 | SYSTOLIC BLOOD PRESSURE: 133 MMHG | WEIGHT: 165.4 LBS | DIASTOLIC BLOOD PRESSURE: 80 MMHG | RESPIRATION RATE: 16 BRPM

## 2025-01-14 DIAGNOSIS — M79.651 PAIN OF RIGHT THIGH: ICD-10-CM

## 2025-01-14 DIAGNOSIS — M54.16 LUMBAR RADICULOPATHY: Primary | ICD-10-CM

## 2025-01-14 DIAGNOSIS — J01.10 ACUTE NON-RECURRENT FRONTAL SINUSITIS: ICD-10-CM

## 2025-01-14 PROCEDURE — 99214 OFFICE O/P EST MOD 30 MIN: CPT | Performed by: FAMILY MEDICINE

## 2025-01-14 RX ORDER — METHYLPREDNISOLONE 4 MG/1
TABLET ORAL
Qty: 1 KIT | Refills: 0 | Status: SHIPPED | OUTPATIENT
Start: 2025-01-14 | End: 2025-01-20

## 2025-01-14 SDOH — ECONOMIC STABILITY: FOOD INSECURITY: WITHIN THE PAST 12 MONTHS, THE FOOD YOU BOUGHT JUST DIDN'T LAST AND YOU DIDN'T HAVE MONEY TO GET MORE.: NEVER TRUE

## 2025-01-14 SDOH — ECONOMIC STABILITY: FOOD INSECURITY: WITHIN THE PAST 12 MONTHS, YOU WORRIED THAT YOUR FOOD WOULD RUN OUT BEFORE YOU GOT MONEY TO BUY MORE.: NEVER TRUE

## 2025-01-14 NOTE — PROGRESS NOTES
Neftaly Finch  : 1975    Chief Complaint:     Chief Complaint   Patient presents with    Back Pain     LBP for several years, worsening x1 year. Denies any Hx of injuries.       HPI  Recently restarted with Chiropractor for chronic lumbar pain. Feels pain is worsening over last year. No red flag symptoms including fever, chills, sweats, weight loss, bowel/bladder incontinence, numbness, weakness or saddle anesthesia.     Sinus Pain  Patient complains of congestion, cough, frequent clearing of the throat, headaches, nasal congestion, purulent rhinorrhea, and sinus pressure. Onset of symptoms was 8 days ago. Symptoms have been gradually worsening since that time. He is drinking plenty of fluids.  Past history is significant for  nothing respiratory . Patient is a light smoker.    Past medical, surgical, family and social histories reviewed and updated today as appropriate    Health Maintenance Due   Topic Date Due    Pneumococcal 0-64 years Vaccine (1 of 2 - PCV) Never done    Hepatitis B vaccine (1 of 3 - 19+ 3-dose series) Never done    Flu vaccine (1) Never done    COVID-19 Vaccine ( season) Never done       Current Outpatient Medications   Medication Sig Dispense Refill    meloxicam (MOBIC) 15 MG tablet Take 1 tablet by mouth daily 90 tablet 1    traZODone (DESYREL) 100 MG tablet Take 1 tablet by mouth nightly 90 tablet 1     No current facility-administered medications for this visit.       No Known Allergies      REVIEW OF SYSTEMS  Review of Systems   Constitutional:  Negative for activity change, appetite change, chills, diaphoresis, fatigue and fever.   HENT:  Positive for congestion, postnasal drip, rhinorrhea, sinus pressure and sinus pain. Negative for ear discharge and ear pain.    Eyes:  Negative for pain, discharge, redness and itching.   Respiratory:  Positive for cough. Negative for chest tightness, shortness of breath and wheezing.    Cardiovascular:  Negative for chest pain

## 2025-01-23 ASSESSMENT — ENCOUNTER SYMPTOMS
BACK PAIN: 1
WHEEZING: 0
COUGH: 1
ABDOMINAL PAIN: 0
EYE REDNESS: 0
EYE PAIN: 0
SINUS PAIN: 1
DIARRHEA: 0
CHEST TIGHTNESS: 0
NAUSEA: 0
VOMITING: 0
EYE ITCHING: 0
RHINORRHEA: 1
EYE DISCHARGE: 0
SINUS PRESSURE: 1
SHORTNESS OF BREATH: 0

## 2025-06-09 ENCOUNTER — HOSPITAL ENCOUNTER (EMERGENCY)
Age: 50
Discharge: HOME | End: 2025-06-09
Payer: COMMERCIAL

## 2025-06-09 VITALS
DIASTOLIC BLOOD PRESSURE: 79 MMHG | HEART RATE: 57 BPM | SYSTOLIC BLOOD PRESSURE: 120 MMHG | TEMPERATURE: 97.6 F | OXYGEN SATURATION: 98 % | RESPIRATION RATE: 16 BRPM

## 2025-06-09 DIAGNOSIS — M79.651 RIGHT THIGH PAIN: Primary | ICD-10-CM

## 2025-06-09 PROCEDURE — 99284 EMERGENCY DEPT VISIT MOD MDM: CPT

## 2025-06-09 PROCEDURE — 6360000002 HC RX W HCPCS

## 2025-06-09 PROCEDURE — 96372 THER/PROPH/DIAG INJ SC/IM: CPT

## 2025-06-09 PROCEDURE — 6370000000 HC RX 637 (ALT 250 FOR IP)

## 2025-06-09 RX ORDER — ACETAMINOPHEN 500 MG
1000 TABLET ORAL ONCE
Status: COMPLETED | OUTPATIENT
Start: 2025-06-09 | End: 2025-06-09

## 2025-06-09 RX ORDER — LIDOCAINE 4 G/G
1 PATCH TOPICAL DAILY
Qty: 30 PATCH | Refills: 0 | Status: SHIPPED | OUTPATIENT
Start: 2025-06-09 | End: 2025-07-09

## 2025-06-09 RX ORDER — DEXAMETHASONE SODIUM PHOSPHATE 10 MG/ML
10 INJECTION, SOLUTION INTRAMUSCULAR; INTRAVENOUS ONCE
Status: COMPLETED | OUTPATIENT
Start: 2025-06-09 | End: 2025-06-09

## 2025-06-09 RX ORDER — LIDOCAINE 4 G/G
1 PATCH TOPICAL ONCE
Status: DISCONTINUED | OUTPATIENT
Start: 2025-06-09 | End: 2025-06-09 | Stop reason: HOSPADM

## 2025-06-09 RX ORDER — METHOCARBAMOL 500 MG/1
500 TABLET, FILM COATED ORAL 3 TIMES DAILY PRN
Qty: 15 TABLET | Refills: 0 | Status: SHIPPED | OUTPATIENT
Start: 2025-06-09 | End: 2025-06-14

## 2025-06-09 RX ADMIN — ACETAMINOPHEN 1000 MG: 500 TABLET ORAL at 11:42

## 2025-06-09 RX ADMIN — DEXAMETHASONE SODIUM PHOSPHATE 10 MG: 10 INJECTION, SOLUTION INTRAMUSCULAR; INTRAVENOUS at 11:45

## 2025-06-09 ASSESSMENT — PAIN SCALES - GENERAL: PAINLEVEL_OUTOF10: 6

## 2025-06-09 NOTE — ED PROVIDER NOTES
Independent TAMMIE Visit.         [facility] EMERGENCY DEPARTMENT  ED  Encounter Note  Admit Date/RoomTime: 2025 10:59 AM  ED Room: [place] [address]  NAME: Neftaly Finch  : 1975  MRN: [id number]  PCP: Arnold Valadez MD    CHIEF COMPLAINT     Leg Pain (Patient states he has a pinched nerve to leg and has been having issues x 1 week . Has hx of chronic nerve issues)    HISTORY OF PRESENT ILLNESS        Neftaly Finch is a 50 y.o. male, with a past medical hx of  has a past medical history of Back pain and DeQuervain's disease (tenosynovitis). who presents to the ED by private vehicle for evaluation of chronic right thigh nerve pain, beginning years ago. The complaint has been worse in the last couple days and persistent and are moderate in severity.  Patient reports that he is had chronic nerve issues with his right right leg, states has been ongoing intermittently for years, but last couple days has been bit worse.  States that is previously established with a nerve specialist and orthopedic surgeon, however insurance ran out and he was unable to get an MRI of his right lower leg.  States that he has a burning sharp pain in his anterior right thigh and states that he has a palpable nodule there.  States that it radiates down to about his knee but denies going any lower.  He reports that he used to have back pain but his back pain is actually much better than it used to be.  He denies any numbness, weakness, recent trauma or injuries, fevers, or having any other associated symptoms.    REVIEW OF SYSTEMS     Pertinent positives and negatives are stated within HPI, all other systems reviewed and are negative.    Past Medical History:  has a past medical history of Back pain and DeQuervain's disease (tenosynovitis).  Surgical History:  has a past surgical history that includes Vasectomy (); Finger fracture surgery (); Carpal tunnel release; Ulnar tunnel release; Hemorrhoid

## 2025-06-09 NOTE — DISCHARGE INSTRUCTIONS
Thank you for attending [facility] Emergency Department.  There are a few things you need to be reminded about upon discharge:    As we discussed, I do not think any imaging will help us today.  I think need follow-up with orthopedic surgeon in outpatient setting, and I gave you referral.  I recommend lidocaine patches and Robaxin.  Robaxin is a muscle relaxer, may make you more sleepy than usual, do not use before operating machinery or driving.  Please follow-up with your PCP in 2 to 3 days.  If for what ever reason you develop worsening symptoms, such as fever, significant leg pain or swelling, please do not hesitate to return to the emergency department immediately for further evaluation.

## (undated) DEVICE — PACK,LAPAROTOMY,NO GOWNS: Brand: MEDLINE

## (undated) DEVICE — ELECTRODE PT RET AD L9FT HI MOIST COND ADH HYDRGEL CORDED

## (undated) DEVICE — PAD,ABDOMINAL,5"X9",ST,LF,25/BX: Brand: MEDLINE INDUSTRIES, INC.

## (undated) DEVICE — GAUZE,SPONGE,4"X4",16PLY,STRL,LF,10/TRAY: Brand: MEDLINE

## (undated) DEVICE — GLOVE ORANGE PI 7 1/2   MSG9075

## (undated) DEVICE — TOWEL,OR,DSP,ST,BLUE,STD,6/PK,12PK/CS: Brand: MEDLINE

## (undated) DEVICE — GOWN,SIRUS,NONRNF,SETINSLV,XL,20/CS: Brand: MEDLINE

## (undated) DEVICE — SYRINGE MED 10ML LUERLOCK TIP W/O SFTY DISP

## (undated) DEVICE — INTENDED FOR TISSUE SEPARATION, AND OTHER PROCEDURES THAT REQUIRE A SHARP SURGICAL BLADE TO PUNCTURE OR CUT.: Brand: BARD-PARKER ® STAINLESS STEEL BLADES

## (undated) DEVICE — NON-DEHP CATHETER EXTENSION SET, MALE LUER LOCK ADAPTER

## (undated) DEVICE — TUBING, SUCTION, 1/4" X 10', STRAIGHT: Brand: MEDLINE

## (undated) DEVICE — SYRINGE MED 10ML TRNSLUC BRL PLUNG BLK MRK POLYPR CTRL

## (undated) DEVICE — READY WET SKIN SCRUB TRAY-LF: Brand: MEDLINE INDUSTRIES, INC.

## (undated) DEVICE — BANDAGE ADH W1XL3IN NAT FAB WVN FLX DURABLE N ADH PD SEAL

## (undated) DEVICE — BLADE ES ELASTOMERIC COAT INSUL DURABLE BEND UPTO 90DEG

## (undated) DEVICE — GLOVE ORANGE PI 8   MSG9080

## (undated) DEVICE — NDL CNTR 40CT FM MAG: Brand: MEDLINE INDUSTRIES, INC.

## (undated) DEVICE — NEEDLE HYPO 18GA L1.5IN PNK POLYPR HUB S STL REG BVL STR

## (undated) DEVICE — PENCIL ES L3M BTTN SWCH HOLSTER W/ BLDE ELECTRD EDGE

## (undated) DEVICE — NEEDLE HYPO 25GA L1.5IN BLU POLYPR HUB S STL REG BVL STR

## (undated) DEVICE — COVER,LIGHT HANDLE,FLX,1/PK: Brand: MEDLINE INDUSTRIES, INC.

## (undated) DEVICE — GAUZE,SPONGE,4"X4",16PLY,XRAY,STRL,LF: Brand: MEDLINE

## (undated) DEVICE — SHEARS ENDOSCP L9CM CRV HARM FOCS +

## (undated) DEVICE — 3M™ RED DOT™ MONITORING ELECTRODE WITH FOAM TAPE AND STICKY GEL 2560, 50/BAG, 20/CASE, 72/PLT: Brand: RED DOT™

## (undated) DEVICE — MARKER,SKIN,WI/RULER AND LABELS: Brand: MEDLINE

## (undated) DEVICE — YANKAUER,BULB TIP,W/O VENT,RIGID,STERILE: Brand: MEDLINE

## (undated) DEVICE — 12 ML SYRINGE,LUER-LOCK TIP: Brand: MONOJECT

## (undated) DEVICE — SYRINGE, LUER LOCK, 5ML: Brand: MEDLINE

## (undated) DEVICE — Device: Brand: PORTEX

## (undated) DEVICE — TRAY PROCED CUSTOM RECTAL

## (undated) DEVICE — GAUZE,SPONGE,4"X4",8PLY,STRL,LF,10/TRAY: Brand: MEDLINE

## (undated) DEVICE — DOUBLE BASIN SET: Brand: MEDLINE INDUSTRIES, INC.

## (undated) DEVICE — 6 ML SYRINGE LUER-LOCK TIP: Brand: MONOJECT

## (undated) DEVICE — SOLUTION IRRIG 1500ML 0.9% SOD CHL USP POUR PLAS BTL